# Patient Record
Sex: FEMALE | Employment: UNEMPLOYED | URBAN - METROPOLITAN AREA
[De-identification: names, ages, dates, MRNs, and addresses within clinical notes are randomized per-mention and may not be internally consistent; named-entity substitution may affect disease eponyms.]

---

## 2017-06-26 ENCOUNTER — HOSPITAL ENCOUNTER (OUTPATIENT)
Dept: LAB | Age: 57
Discharge: HOME | End: 2017-06-26
Payer: MEDICARE

## 2017-06-26 PROCEDURE — 87624 HPV HI-RISK TYP POOLED RSLT: CPT | Performed by: OBSTETRICS & GYNECOLOGY

## 2017-06-26 PROCEDURE — 88175 CYTOPATH C/V AUTO FLUID REDO: CPT | Performed by: OBSTETRICS & GYNECOLOGY

## 2018-01-15 ENCOUNTER — HOSPITAL ENCOUNTER (OUTPATIENT)
Dept: LAB | Age: 58
Discharge: HOME | End: 2018-01-15
Payer: MEDICARE

## 2018-01-15 PROCEDURE — 88175 CYTOPATH C/V AUTO FLUID REDO: CPT | Performed by: OBSTETRICS & GYNECOLOGY

## 2018-04-30 ENCOUNTER — HOSPITAL ENCOUNTER (OUTPATIENT)
Dept: LAB | Age: 58
Discharge: HOME | End: 2018-04-30
Payer: MEDICARE

## 2018-04-30 PROCEDURE — 88175 CYTOPATH C/V AUTO FLUID REDO: CPT | Performed by: OBSTETRICS & GYNECOLOGY

## 2018-05-21 ENCOUNTER — HOSPITAL ENCOUNTER (EMERGENCY)
Age: 58
Discharge: HOME | End: 2018-05-21
Attending: EMERGENCY MEDICINE
Payer: MEDICARE

## 2018-05-21 VITALS
TEMPERATURE: 98 F | HEART RATE: 46 BPM | DIASTOLIC BLOOD PRESSURE: 69 MMHG | SYSTOLIC BLOOD PRESSURE: 135 MMHG | OXYGEN SATURATION: 99 % | RESPIRATION RATE: 19 BRPM

## 2018-05-21 DIAGNOSIS — M79.89 RIGHT LEG SWELLING: Primary | ICD-10-CM

## 2018-05-21 LAB
ALBUMIN SERPL-MCNC: 4.1 G/DL (ref 3.4–5)
ALBUMIN/GLOB SERPL: 1.3 {RATIO} (ref 0.8–1.7)
ALP SERPL-CCNC: 106 U/L (ref 45–117)
ALT SERPL-CCNC: 23 U/L (ref 13–56)
ANION GAP SERPL CALC-SCNC: 8 MMOL/L (ref 3–18)
AST SERPL-CCNC: 22 U/L (ref 15–37)
BASOPHILS # BLD: 0 K/UL (ref 0–0.06)
BASOPHILS NFR BLD: 1 % (ref 0–2)
BILIRUB SERPL-MCNC: 0.2 MG/DL (ref 0.2–1)
BNP SERPL-MCNC: 1224 PG/ML (ref 0–900)
BUN SERPL-MCNC: 42 MG/DL (ref 7–18)
BUN/CREAT SERPL: 25 (ref 12–20)
CALCIUM SERPL-MCNC: 8.4 MG/DL (ref 8.5–10.1)
CHLORIDE SERPL-SCNC: 112 MMOL/L (ref 100–108)
CO2 SERPL-SCNC: 22 MMOL/L (ref 21–32)
CREAT SERPL-MCNC: 1.71 MG/DL (ref 0.6–1.3)
DIFFERENTIAL METHOD BLD: ABNORMAL
EOSINOPHIL # BLD: 0.2 K/UL (ref 0–0.4)
EOSINOPHIL NFR BLD: 5 % (ref 0–5)
ERYTHROCYTE [DISTWIDTH] IN BLOOD BY AUTOMATED COUNT: 15.5 % (ref 11.6–14.5)
GLOBULIN SER CALC-MCNC: 3.1 G/DL (ref 2–4)
GLUCOSE SERPL-MCNC: 103 MG/DL (ref 74–99)
HCT VFR BLD AUTO: 34.2 % (ref 35–45)
HGB BLD-MCNC: 11.2 G/DL (ref 12–16)
INR PPP: 1.1 (ref 0.8–1.2)
LYMPHOCYTES # BLD: 0.7 K/UL (ref 0.9–3.6)
LYMPHOCYTES NFR BLD: 21 % (ref 21–52)
MCH RBC QN AUTO: 29.9 PG (ref 24–34)
MCHC RBC AUTO-ENTMCNC: 32.7 G/DL (ref 31–37)
MCV RBC AUTO: 91.4 FL (ref 74–97)
MONOCYTES # BLD: 0.3 K/UL (ref 0.05–1.2)
MONOCYTES NFR BLD: 8 % (ref 3–10)
NEUTS SEG # BLD: 2.2 K/UL (ref 1.8–8)
NEUTS SEG NFR BLD: 65 % (ref 40–73)
PLATELET # BLD AUTO: 126 K/UL (ref 135–420)
PMV BLD AUTO: 10.4 FL (ref 9.2–11.8)
POTASSIUM SERPL-SCNC: 5.1 MMOL/L (ref 3.5–5.5)
PROT SERPL-MCNC: 7.2 G/DL (ref 6.4–8.2)
PROTHROMBIN TIME: 13.7 SEC (ref 11.5–15.2)
RBC # BLD AUTO: 3.74 M/UL (ref 4.2–5.3)
SODIUM SERPL-SCNC: 142 MMOL/L (ref 136–145)
WBC # BLD AUTO: 3.4 K/UL (ref 4.6–13.2)

## 2018-05-21 PROCEDURE — 80053 COMPREHEN METABOLIC PANEL: CPT | Performed by: EMERGENCY MEDICINE

## 2018-05-21 PROCEDURE — 85025 COMPLETE CBC W/AUTO DIFF WBC: CPT | Performed by: EMERGENCY MEDICINE

## 2018-05-21 PROCEDURE — 93971 EXTREMITY STUDY: CPT

## 2018-05-21 PROCEDURE — 85610 PROTHROMBIN TIME: CPT | Performed by: EMERGENCY MEDICINE

## 2018-05-21 PROCEDURE — 93005 ELECTROCARDIOGRAM TRACING: CPT

## 2018-05-21 PROCEDURE — 99285 EMERGENCY DEPT VISIT HI MDM: CPT

## 2018-05-21 PROCEDURE — 94762 N-INVAS EAR/PLS OXIMTRY CONT: CPT

## 2018-05-21 PROCEDURE — 83880 ASSAY OF NATRIURETIC PEPTIDE: CPT | Performed by: EMERGENCY MEDICINE

## 2018-05-21 RX ORDER — LISINOPRIL 5 MG/1
5 TABLET ORAL 2 TIMES DAILY
COMMUNITY
Start: 2018-02-19

## 2018-05-21 RX ORDER — ACETAMINOPHEN 325 MG/1
325 TABLET ORAL
COMMUNITY

## 2018-05-21 RX ORDER — PRAVASTATIN SODIUM 20 MG/1
40 TABLET ORAL
COMMUNITY
Start: 2018-02-19

## 2018-05-21 RX ORDER — FUROSEMIDE 20 MG/1
40 TABLET ORAL 2 TIMES DAILY
COMMUNITY
Start: 2018-02-19 | End: 2018-07-12 | Stop reason: DRUGHIGH

## 2018-05-21 RX ORDER — CETIRIZINE HCL 10 MG
10 TABLET ORAL DAILY
COMMUNITY
Start: 2018-02-27

## 2018-05-21 RX ORDER — HYDROXYZINE 25 MG/1
25 TABLET, FILM COATED ORAL
COMMUNITY
Start: 2018-02-19

## 2018-05-21 NOTE — ED NOTES
Pt initially refused cardiac monitor because she states the electrodes cause a rash over time.   Pt then agreed to be placed on monitor and states she will take them off if they start to irritate her skin

## 2018-05-21 NOTE — ED PROVIDER NOTES
HPI Comments: Carlos Braga is a 65 y.o. Female with remote h/o cardiac transplant 80, with c/o swelling to rle today. Has been swelling off/on for last month but got worse today. Denies cp, new sob. No fcs. Swelling is constant with nothing taken. No h/o vte. Was on eliquis which she stopped 5 days ago due to gi procedure. No recent immobilization, surgery, active cancer    The history is provided by the patient and medical records. Past Medical History:   Diagnosis Date    Arthritis     Avascular necrosis of femoral head, left ([address]     CAD (coronary artery disease)     Cancer (HCC)     Vaginal    GERD (gastroesophageal reflux disease)     Hypercholesteremia     Hypertension     Ill-defined condition     fractured foot, Bilateral    Ill-defined condition     wrist fracture    Psychiatric disorder     Depression, Anxiety, Bipolar    S/P radiation therapy     Status post chemotherapy        Past Surgical History:   Procedure Laterality Date    HIP ARTHROSCOPY, DX      HX CYST REMOVAL  2006    HX HEART VALVE SURGERY  2013    Tricuspid valve    TRANSPLANTATION OF HEART  1999         No family history on file. Social History     Social History    Marital status: LEGALLY      Spouse name: N/A    Number of children: N/A    Years of education: N/A     Occupational History    Not on file. Social History Main Topics    Smoking status: Former Smoker     Quit date: 1/23/2015    Smokeless tobacco: Never Used    Alcohol use No    Drug use: No    Sexual activity: Not on file     Other Topics Concern    Not on file     Social History Narrative         ALLERGIES: Latex, natural rubber; Dobutamine; Imuran [azathioprine]; Other medication; Nsaids (non-steroidal anti-inflammatory drug); Avelox [moxifloxacin]; Milrinone; Other plant, animal, environmental; Tape [adhesive]; and Copper    Review of Systems   Constitutional: Negative for fever.    HENT: Negative for sore throat and trouble swallowing. Eyes: Negative for visual disturbance. Respiratory: Negative for cough. Cardiovascular: Positive for leg swelling. Negative for chest pain. Gastrointestinal: Positive for blood in stool (intermittently since feb which is why she is seeing gi upvoming). Negative for abdominal pain. Endocrine: Negative for polyuria. Genitourinary: Negative for difficulty urinating. Musculoskeletal: Negative for gait problem. Skin: Negative for rash. Allergic/Immunologic: Negative for immunocompromised state. Neurological: Negative for syncope. Hematological: Bruises/bleeds easily (when she is on eliquis). Psychiatric/Behavioral: Negative for sleep disturbance. Vitals:    05/21/18 1930 05/21/18 1932 05/21/18 1945 05/21/18 2000   BP: 151/79  138/70 134/73   Pulse:  (!) 48 (!) 48 (!) 45   Resp:  13  16   Temp:       SpO2:  99% 98% 98%            Physical Exam   Constitutional: She is oriented to person, place, and time. She appears well-developed and well-nourished. No distress. HENT:   Head: Normocephalic and atraumatic. Right Ear: External ear normal.   Left Ear: External ear normal.   Nose: Nose normal.   Mouth/Throat: Uvula is midline, oropharynx is clear and moist and mucous membranes are normal.   Eyes: Conjunctivae are normal. No scleral icterus. Neck: Neck supple. Cardiovascular: Normal rate, regular rhythm and intact distal pulses. Murmur heard. Pulmonary/Chest: Effort normal and breath sounds normal.   Abdominal: Soft. There is no tenderness. Musculoskeletal: She exhibits edema. Legs:  Neurological: She is alert and oriented to person, place, and time. Gait normal.   Skin: Skin is warm and dry. She is not diaphoretic. Psychiatric: Her behavior is normal.   Nursing note and vitals reviewed.        [facility]      ED Course       Procedures  Vitals:  Patient Vitals for the past 12 hrs:   Temp Pulse Resp BP SpO2   05/21/18 2000 - (!) 45 16 134/73 98 % 05/21/18 1945 - (!) 48 - 138/70 98 %   05/21/18 1932 - (!) 48 13 - 99 %   05/21/18 1930 - - - 151/79 -   05/21/18 1915 - (!) 46 18 128/62 97 %   05/21/18 1900 - (!) 47 18 118/74 96 %   05/21/18 1848 98 °F (36.7 °C) (!) 48 13 (!) 142/92 97 %   05/21/18 1847 98.4 °F (36.9 °C) (!) 47 12 120/74 97 %         Medications ordered:   Medications - No data to display      Lab findings:  Recent Results (from the past 12 hour(s))   CBC WITH AUTOMATED DIFF    Collection Time: 05/21/18  6:15 PM   Result Value Ref Range    WBC 3.4 (L) 4.6 - 13.2 K/uL    RBC 3.74 (L) 4.20 - 5.30 M/uL    HGB 11.2 (L) 12.0 - 16.0 g/dL    HCT 34.2 (L) 35.0 - 45.0 %    MCV 91.4 74.0 - 97.0 FL    MCH 29.9 24.0 - 34.0 PG    MCHC 32.7 31.0 - 37.0 g/dL    RDW 15.5 (H) 11.6 - 14.5 %    PLATELET 531 (L) 058 - 420 K/uL    MPV 10.4 9.2 - 11.8 FL    NEUTROPHILS 65 40 - 73 %    LYMPHOCYTES 21 21 - 52 %    MONOCYTES 8 3 - 10 %    EOSINOPHILS 5 0 - 5 %    BASOPHILS 1 0 - 2 %    ABS. NEUTROPHILS 2.2 1.8 - 8.0 K/UL    ABS. LYMPHOCYTES 0.7 (L) 0.9 - 3.6 K/UL    ABS. MONOCYTES 0.3 0.05 - 1.2 K/UL    ABS. EOSINOPHILS 0.2 0.0 - 0.4 K/UL    ABS. BASOPHILS 0.0 0.0 - 0.06 K/UL    DF AUTOMATED     PROTHROMBIN TIME + INR    Collection Time: 05/21/18  6:15 PM   Result Value Ref Range    Prothrombin time 13.7 11.5 - 15.2 sec    INR 1.1 0.8 - 1.2     METABOLIC PANEL, COMPREHENSIVE    Collection Time: 05/21/18  6:15 PM   Result Value Ref Range    Sodium 142 136 - 145 mmol/L    Potassium 5.1 3.5 - 5.5 mmol/L    Chloride 112 (H) 100 - 108 mmol/L    CO2 22 21 - 32 mmol/L    Anion gap 8 3.0 - 18 mmol/L    Glucose 103 (H) 74 - 99 mg/dL    BUN 42 (H) 7.0 - 18 MG/DL    Creatinine 1.71 (H) 0.6 - 1.3 MG/DL    BUN/Creatinine ratio 25 (H) 12 - 20      GFR est AA 37 (L) >60 ml/min/1.73m2    GFR est non-AA 31 (L) >60 ml/min/1.73m2    Calcium 8.4 (L) 8.5 - 10.1 MG/DL    Bilirubin, total 0.2 0.2 - 1.0 MG/DL    ALT (SGPT) 23 13 - 56 U/L    AST (SGOT) 22 15 - 37 U/L    Alk.  phosphatase 106 45 - 117 U/L    Protein, total 7.2 6.4 - 8.2 g/dL    Albumin 4.1 3.4 - 5.0 g/dL    Globulin 3.1 2.0 - 4.0 g/dL    A-G Ratio 1.3 0.8 - 1.7     NT-PRO BNP    Collection Time: 05/21/18  6:15 PM   Result Value Ref Range    NT pro-BNP 1224 (H) 0 - 900 PG/ML       EKG interpretation by ED Physician:      X-Ray, CT or other radiology findings or impressions:  DUPLEX LOWER EXT VENOUS RIGHT         neg for dvt      Progress notes, Consult notes or additional Procedure notes:   No dvt. prob slight fluid overload. Pt has lasix to take as needed which she has not been. Doubt need for other work, transplant consult  I have discussed with patient and/or family/sig other the results, interpretation of any imaging if performed, suspected diagnosis and treatment plan to include instructions regarding the diagnoses listed to which understanding was expressed with all questions answered      Reevaluation of patient:   stable    Disposition:  Diagnosis:   1. Right leg swelling        Disposition: home    Follow-up Information     Follow up With Details Comments Contact Info    Reagan Fuller MD Schedule an appointment as soon as possible for a visit  [place]  [phone]              Patient's Medications   Start Taking    No medications on file   Continue Taking    ACETAMINOPHEN (TYLENOL) 325 MG TABLET    325 mg. APIXABAN (ELIQUIS) 5 MG TABLET    5 mg. ASPIRIN DELAYED-RELEASE 81 MG TABLET    Take 81 mg by mouth daily. BUPROPION SR (WELLBUTRIN SR) 200 MG SR TABLET    Take 200 mg by mouth two (2) times a day. Indications: DEPRESSION    CETIRIZINE (ZYRTEC) 10 MG TABLET    10 mg. CYCLOSPORINE MODIFIED (NEORAL) 25 MG CAPSULE    Take 50 mg by mouth two (2) times a day. FUROSEMIDE (LASIX) 20 MG TABLET    20 mg. HYDROXYZINE HCL (ATARAX) 25 MG TABLET    25 mg. LISINOPRIL (PRINIVIL, ZESTRIL) 5 MG TABLET    5 mg. LORAZEPAM (ATIVAN) 1 MG TABLET    Take 1 mg by mouth three (3) times daily. MYCOPHENOLATE (CELLCEPT) 500 MG TABLET    Take 250 mg by mouth two (2) times a day. OXCARBAZEPINE (TRILEPTAL) 150 MG TABLET    Take  by mouth every morning. OXCARBAZEPINE (TRILEPTAL) 300 MG TABLET    Take 450 mg by mouth nightly. POLYETHYLENE GLYCOL (MIRALAX) 17 GRAM PACKET    Take 8.5 g by mouth daily as needed. PRAVASTATIN (PRAVACHOL) 20 MG TABLET    40 mg. PREGABALIN (LYRICA) 200 MG CAPSULE    Take 200 mg by mouth three (3) times daily.    These Medications have changed    No medications on file   Stop Taking    No medications on file

## 2018-05-21 NOTE — ED TRIAGE NOTES
Pt states she noticed swelling to her right leg today and reports SOB on exertion over the past 4 months

## 2018-05-22 ENCOUNTER — ANESTHESIA EVENT (OUTPATIENT)
Dept: ENDOSCOPY | Age: 58
End: 2018-05-22
Payer: MEDICARE

## 2018-05-22 LAB
ATRIAL RATE: 49 BPM
CALCULATED P AXIS, ECG09: 20 DEGREES
CALCULATED R AXIS, ECG10: 0 DEGREES
CALCULATED T AXIS, ECG11: -56 DEGREES
DIAGNOSIS, 93000: NORMAL
P-R INTERVAL, ECG05: 274 MS
Q-T INTERVAL, ECG07: 468 MS
QRS DURATION, ECG06: 162 MS
QTC CALCULATION (BEZET), ECG08: 422 MS
VENTRICULAR RATE, ECG03: 49 BPM

## 2018-05-22 NOTE — PROCEDURES
[facility]  *** FINAL REPORT ***    Name: Kusum Huynh  MRN: [id number]    Outpatient  : 1960  HIS Order #: [id number]  [id number] [facility] Visit #: [id number]  Date: 21 May 2018    TYPE OF TEST: Peripheral Venous Testing    REASON FOR TEST  Pain in limb    Right Leg:-  Deep venous thrombosis:           No  Superficial venous thrombosis:    No  Deep venous insufficiency:        Not examined  Superficial venous insufficiency: Not examined      INTERPRETATION/FINDINGS  Duplex images were obtained using 2-D gray scale, color flow, and  spectral Doppler analysis. Right leg :  1. Deep vein(s) visualized include the common femoral, proximal  femoral, mid femoral, distal femoral, popliteal(above knee),  popliteal(fossa), popliteal(below knee), posterior tibial and peroneal   veins. 2. No evidence of deep venous thrombosis detected in the veins  visualized. 3. No evidence of deep vein thrombosis in the contralateral common  femoral vein. 4. No evidence of superficial thrombosis detected. ADDITIONAL COMMENTS    I have personally reviewed the data relevant to the interpretation of  this  study. TECHNOLOGIST: WALTER Spangler  Signed: 2018 08:10 PM    PHYSICIAN: Linda Valdes.  Mavis Moncada MD  Signed: 2018 11:04 PM

## 2018-05-22 NOTE — ED NOTES
8:39 PM  05/21/18     Discharge instructions given to patient (name) with verbalization of understanding. Patient accompanied by friend. Patient discharged with the following prescriptions none. Patient discharged to home (destination).       Winifred Amaral RN

## 2018-05-23 ENCOUNTER — ANESTHESIA (OUTPATIENT)
Dept: ENDOSCOPY | Age: 58
End: 2018-05-23
Payer: MEDICARE

## 2018-05-23 ENCOUNTER — HOSPITAL ENCOUNTER (OUTPATIENT)
Age: 58
Setting detail: OUTPATIENT SURGERY
Discharge: HOME | End: 2018-05-23
Attending: INTERNAL MEDICINE | Admitting: INTERNAL MEDICINE
Payer: MEDICARE

## 2018-05-23 VITALS
RESPIRATION RATE: 18 BRPM | SYSTOLIC BLOOD PRESSURE: 95 MMHG | HEART RATE: 54 BPM | DIASTOLIC BLOOD PRESSURE: 72 MMHG | TEMPERATURE: 97.8 F | HEIGHT: 66 IN | OXYGEN SATURATION: 96 % | WEIGHT: 196.56 LBS | BODY MASS INDEX: 31.59 KG/M2

## 2018-05-23 PROBLEM — R19.4 ENCOUNTER FOR DIAGNOSTIC COLONOSCOPY DUE TO CHANGE IN BOWEL HABITS: Status: ACTIVE | Noted: 2018-05-23

## 2018-05-23 PROBLEM — K62.5 RECTAL BLEEDING: Status: ACTIVE | Noted: 2018-05-23

## 2018-05-23 PROCEDURE — 74011250636 HC RX REV CODE- 250/636: Performed by: NURSE ANESTHETIST, CERTIFIED REGISTERED

## 2018-05-23 PROCEDURE — 74011250636 HC RX REV CODE- 250/636

## 2018-05-23 PROCEDURE — 74011000250 HC RX REV CODE- 250

## 2018-05-23 PROCEDURE — 76060000031 HC ANESTHESIA FIRST 0.5 HR: Performed by: INTERNAL MEDICINE

## 2018-05-23 PROCEDURE — 76040000019: Performed by: INTERNAL MEDICINE

## 2018-05-23 PROCEDURE — 77030031670 HC DEV INFL ENDOTEK BIG60 MRTM -B: Performed by: INTERNAL MEDICINE

## 2018-05-23 RX ORDER — SODIUM CHLORIDE 0.9 % (FLUSH) 0.9 %
5-10 SYRINGE (ML) INJECTION EVERY 8 HOURS
Status: CANCELLED | OUTPATIENT
Start: 2018-05-23 | End: 2018-05-23

## 2018-05-23 RX ORDER — LIDOCAINE HYDROCHLORIDE 20 MG/ML
INJECTION, SOLUTION EPIDURAL; INFILTRATION; INTRACAUDAL; PERINEURAL AS NEEDED
Status: DISCONTINUED | OUTPATIENT
Start: 2018-05-23 | End: 2018-05-23 | Stop reason: HOSPADM

## 2018-05-23 RX ORDER — SODIUM CHLORIDE, SODIUM LACTATE, POTASSIUM CHLORIDE, CALCIUM CHLORIDE 600; 310; 30; 20 MG/100ML; MG/100ML; MG/100ML; MG/100ML
25 INJECTION, SOLUTION INTRAVENOUS CONTINUOUS
Status: DISCONTINUED | OUTPATIENT
Start: 2018-05-23 | End: 2018-05-23 | Stop reason: HOSPADM

## 2018-05-23 RX ORDER — SODIUM CHLORIDE 9 MG/ML
25 INJECTION, SOLUTION INTRAVENOUS CONTINUOUS
Status: CANCELLED | OUTPATIENT
Start: 2018-05-23 | End: 2018-05-23

## 2018-05-23 RX ORDER — FENTANYL CITRATE 50 UG/ML
INJECTION, SOLUTION INTRAMUSCULAR; INTRAVENOUS AS NEEDED
Status: DISCONTINUED | OUTPATIENT
Start: 2018-05-23 | End: 2018-05-23 | Stop reason: HOSPADM

## 2018-05-23 RX ORDER — FAMOTIDINE 20 MG/1
20 TABLET, FILM COATED ORAL ONCE
Status: DISCONTINUED | OUTPATIENT
Start: 2018-05-23 | End: 2018-05-23 | Stop reason: HOSPADM

## 2018-05-23 RX ORDER — SODIUM CHLORIDE 0.9 % (FLUSH) 0.9 %
5-10 SYRINGE (ML) INJECTION AS NEEDED
Status: CANCELLED | OUTPATIENT
Start: 2018-05-23 | End: 2018-05-23

## 2018-05-23 RX ORDER — PROPOFOL 10 MG/ML
INJECTION, EMULSION INTRAVENOUS AS NEEDED
Status: DISCONTINUED | OUTPATIENT
Start: 2018-05-23 | End: 2018-05-23 | Stop reason: HOSPADM

## 2018-05-23 RX ORDER — EPINEPHRINE 1 MG/ML
INJECTION, SOLUTION, CONCENTRATE INTRAVENOUS AS NEEDED
Status: DISCONTINUED | OUTPATIENT
Start: 2018-05-23 | End: 2018-05-23 | Stop reason: HOSPADM

## 2018-05-23 RX ORDER — DEXTROMETHORPHAN/PSEUDOEPHED 2.5-7.5/.8
1.2 DROPS ORAL
Status: CANCELLED | OUTPATIENT
Start: 2018-05-23

## 2018-05-23 RX ADMIN — LIDOCAINE HYDROCHLORIDE 40 MG: 20 INJECTION, SOLUTION EPIDURAL; INFILTRATION; INTRACAUDAL; PERINEURAL at 13:53

## 2018-05-23 RX ADMIN — PROPOFOL 30 MG: 10 INJECTION, EMULSION INTRAVENOUS at 13:53

## 2018-05-23 RX ADMIN — EPINEPHRINE 0.1 MG: 1 INJECTION, SOLUTION, CONCENTRATE INTRAVENOUS at 13:53

## 2018-05-23 RX ADMIN — PROPOFOL 10 MG: 10 INJECTION, EMULSION INTRAVENOUS at 13:55

## 2018-05-23 RX ADMIN — PROPOFOL 10 MG: 10 INJECTION, EMULSION INTRAVENOUS at 13:59

## 2018-05-23 RX ADMIN — SODIUM CHLORIDE, SODIUM LACTATE, POTASSIUM CHLORIDE, AND CALCIUM CHLORIDE 25 ML/HR: 600; 310; 30; 20 INJECTION, SOLUTION INTRAVENOUS at 13:44

## 2018-05-23 RX ADMIN — FENTANYL CITRATE 25 MCG: 50 INJECTION, SOLUTION INTRAMUSCULAR; INTRAVENOUS at 13:57

## 2018-05-23 RX ADMIN — PROPOFOL 10 MG: 10 INJECTION, EMULSION INTRAVENOUS at 14:06

## 2018-05-23 RX ADMIN — PROPOFOL 20 MG: 10 INJECTION, EMULSION INTRAVENOUS at 13:57

## 2018-05-23 RX ADMIN — PROPOFOL 10 MG: 10 INJECTION, EMULSION INTRAVENOUS at 14:01

## 2018-05-23 RX ADMIN — PROPOFOL 10 MG: 10 INJECTION, EMULSION INTRAVENOUS at 14:04

## 2018-05-23 RX ADMIN — PROPOFOL 10 MG: 10 INJECTION, EMULSION INTRAVENOUS at 13:54

## 2018-05-23 NOTE — H&P
Reason for Appointment   1. Rectal Bleeding. ???hemorrhoids   2. Constipation     History of Present Illness   General:   I saw the patient in the office last in June 2016, at that point because of left lower quadrant pain in the setting of constipation. I suggested that she change MiraLAX twice a day to once a day 2 doses at the time, and adjust the dose to give a soft solid formed stool every day. I was suspicious that her left lower quadrant pain was secondary to constipation itself. In terms of colon cancer screening she had a personal history of adenomatous polyps. Her next colonoscopy was due in late 2017 on a 5 year surveillance interval. She did not return last year for a colonoscopy as recommended. In terms of acid reflux symptoms these were primarily nocturnal. She was advised to sleep on a wedge pillow, not to eat or drink anything within 3 hours of bedtime and I did not recommend pharmacologic therapy. I have not seen the patient now in almost 2 years. May 7, 2018: The patient indicates that she was diagnosed with vaginal cancer in late 2016 and spent the rest of 2016 and 2017 being treated. She has been declared in remission now, and has been told that it is time to turn her attention back to colon cancer screening and come back to see me and arrange a colonoscopy. In addition, since February or so she's been having frequent episodes of rectal bleeding. She went to the ER on December 26 with hemorrhoidal bleeding and was given Anusol and Entocort. She saw her primary care physician who thought she might have hemorrhoids in addition to recurrent anal condyloma, and was told to use ProctoFoam, but also to see me for evaluation and treatment. The patient indicates that she did take MiraLAX shortly after seeing me in 2016, but started a high-fiber diet and found that her constipation problems were well controlled with dietary fiber alone.  In January or February of this year her bowel movements changed, however. Instead of having soft, formed stool every day she is having a hard stool or semi-hard formed stool every 3-4 days with some straining. She has been taking Colace on a regular basis which has made the stools somewhat less hard. She still is having bleeding almost every time she moves her bowels, and sometimes even without defecation. The blood is red and thick without clots. It does drip out of the anus at times. It does color the commode water red and seems to be separate from the bowel movement itself. She does not have any significant anal pain or abdominal discomfort. Her appetite is good and her weight is stable. When she was taking MiraLAX previously at a dose of 17 g daily she was having soft mushy stools several times a day which was inconvenient. She did not try to reduce the dose to get a more solid stool that was less frequent. She has no symptoms of indigestion, heartburn or trouble swallowing. Her appetite is good and her weight is stable. Within the last week her right lower extremity trauma to the foot and ankle and lower shin were much more swollen than the left. This resolved spontaneously. There was no tenderness in her calf or posterior thigh associated with unilateral edema. She's never had this problem in the past. She did have surgery for ingrown toenail on the right however, this finally seems to have healed.      Current Medications   Taking    Wellbutrin  mg/12 hours tablet, extended release 1 tab(s) orally 3 times a day    lorazepam 1 mg tablet 1 tab(s) orally 3 times a day    Trileptal 150 mg tablet 1 tab(s) orally 4 times a day    Aspir 81 81 mg delayed release tablet 1 tab(s) orally once a day    Lyrica 200 mg capsule 1 cap(s) orally 3 times a day    MiraLax - powder for reconstitution 17 g orally once a day    Colace sodium 100 mg capsule 1 cap(s) orally once a day    CellCept 250 mg capsule 3 tabs orally 2 times a day    pravastatin 20 mg tablet 1 tab(s) orally bid Eliquis 5 mg tablet 1 tab(s) orally 2 times a day    Lasix 20 mg tablet 1 tab(s) orally once a day    lisinopril 10 mg tablet 1 tab(s) orally once a day    Discontinued    Folbee Vitamin B Complex with Folic Acid tablet 1 tab(s) orally once a day    Oscal 500 + D 3 times qd    Citrucel Lax 500 mg tablet 3tab(s) orally twice a day    trilipil 150mg in Am and 450 mg q PM    Lasix 20 mg tablet 1 tab(s) orally prn    potassium chloride 10 mEq capsule, extended release 1 cap(s) orally prn    simvastatin 20 mg tablet 1 tab(s) orally once a day (at bedtime)    Trimethoprim-Sulfamethoxazole 160-800 mg 1 tab PO every M,W,F    Flagyl 500 mg tablet 1 tab(s) orally Q6H    cyclosporine 125 mg capsule 1 cap(s) orally as directed    Medication List reviewed and reconciled with the patient      Past Medical History   Mitral valve regurgitation. Cardiomyopathy. Hypertension. Hyperlipidemia. Depression. Anxiety disorder. Bipolar disorder. Surgical History   Heart transplant    Left hip core decompression    laser for cervical cancer    Cyst resection from back    surgical excision of anal warts    Cardioversion 2017   Cardiac catheterization 2018     Family History   Father: , Father had heart attacks in his mid 35s ;suicide, diagnosed with Heart Disease   Mother: alive   Brother(s): 2 brothers alive - brother had a heart attack and  at age 32. ..1 is bipolar, diagnosed with Heart Disease   No family history of colon cancer in first degree relatives but her maternal grandmother had colon cancer. Congenital cardiomyopathy. No chronic liver disease. Social History   Marital Status: -----common-law . Occupation: Disabled (on disability). Smoking Tobacco use: former smoker quit in . Alcohol: yes, few drinks a week, liquor. no Tobacco.   no Drugs (Illicit), Crack recently 11/10/11. no Blood transfusions.       Allergies   Imuran: Bad liver trouble NSAIDS: contrary to immmunosuppressants   Avelox: ? ???   Latex: rash   Electrodes: hives     Review of Systems   Complete review of systems was taken and reviewed with the patient on 5/7/18 and will be scanned into the medical record. Positives will be noted in HPI. Negatives will not be listed here. Vital Signs   BMI 31.32, HR 51, /85, Wt 200, Ht 5'7\", RR 18. Examination   General examination:  LABORATORY DATA: Lab work March 16, 2018: WBC 4.7, hemoglobin 12.6, hematocrit 37.4, MCV 90 point was 150. Lab works Feb 2018: WBC 3.9, hemoglobin 11.5, hematocrit 35.2, MCV 90, platelets 183. BMP showed a glucose of 112, BUN of 57 crit 1.8, GFR estimated 34.8. CT scan of the chest abdomen and pelvis October 30, 2017: Indication history of vaginal cancer. Liver, spleen, bile ducts, gallbladder normal. Appendix not well visualized. Stomach and small bowel normal. Previously seen left upper lobe pulmonary nodule no longer appreciated. Other changes in the lungs unchanged. Physical Examination   General: Pleasant, anxious appearing female in no obvious distress. Nodes: No supraclavicular, axillary, cervical nodes palpable. Chest: Clear to auscultation with symmetric breath sounds and good air movement. Heart: First and second heart sounds are normal. No murmurs, rubs, gallops. Abdomen: Soft, nontender. No organomegaly or mass palpable. Bowel sounds are normal with no bruits. Extremities: No pedal edema. Rectal examination: Will be performed as part of colonoscopy. Assessments     1. Rectal bleeding - K62.5 (Primary), Differential diagnosis includes bleeding from hemorrhoids, anal condyloma, anal neoplasm, proctitis, distal colonic neoplasm, just a colonic polyp, endoscopic evaluation is necessary to determine the precise cause of bleeding so the most appropriate recommendations can be made as to therapy.  In the meantime, I suspect normalization of bowel function will help the bleeding as it sounds as though it is related to the trauma of defecation for the most part, although she does have spontaneous episodes of bleeding without defecation by her history. 2. Personal history of colonic polyps - Z86.010, Adenomatous polyps on colonoscopy in the past. The patient will be due for followup colonoscopy at a 5 year surveillance interval at the end of 2017 was recommended. This has been delayed because of her personal history of vaginal cancer, however. 3. Heart transplant status - Z94.1     4. Change in bowel habit - R19.4, Constipation recently well controlled on MiraLax twice a day and Colace in the past, and more recently controlled with a high-fiber diet by the patient's report. Her bowel pattern changed however, in January or February for unclear reasons. Medications have included pravastatin, Lisinopril and Lasix. There is some improvement in the softness of her stool taking Colace. I will suggest restarting MiraLAX at a lower dose than the previous dose to prevent mushy stool frequently to see if this is acceptable to the patient. 5. Hx of condyloma acuminatum - Z86.19, Anal condyloma on colonoscopy in 2008 removed at that time. No persistent or recurrent condyloma on colonoscopy in 2013. The patient reports that her PCP believes that condyloma has returned now based on recent digital examination. This will need to be determined at the time of upcoming colonoscopy. Referral to colorectal surgery may be necessary. Treatment   1. Rectal bleeding   Start PEG - 3350 * with electrolytes powder for reconstitution, -, 4000 mL, orally, every 15 minutes, 16 dose(s), 1 stock jug, Refills 0  Start Reglan for prep tablet, 5 mg, 1 tab(s), orally, 1 dose, 2 days, 2, Refills 0  Start Citrate of Magnesia liquid, 1.745 g/30 mL, 300 mL, orally, once, 2 dose(s), 2 bottles, Refills 0  IMAGING: Colonoscopy with MAC (Win) (Ordered for 05/23/2018)  Notes: 1.  Restart Miralax 8.5 grams in 3 ounces of beverage once a day on a daily basis. Depending on response, she may need to alternate between 8.5 g daily and 17 g daily. I want her to adjust the dose to give a soft, solid, formed bowel movement once a day, and not frequent episodes of mushy stool that she reports when she was taking 17 g daily. 2. Colonoscopy is being arranged because of a personal history of adenomatous polyps. She is somewhat overdue for a 5 year follow-up exam. The colonoscopy will also serve as the best diagnostic test for the cause of her rectal bleeding with hemorrhoids, condyloma, anal neoplasm, distal colonic neoplasm, just colonic polyp, proctitis. More specific treatment for rectal bleeding will depend on the endoscopic diagnosis. May 23 2 PM [facility]. Low residue diet for one week. PEG 3350/Reglan prep. 2. Personal history of colonic polyps   Notes: 1. The patient is overdue for a 5 year follow-up colonoscopy. This is rescheduled now. 3. Change in bowel habit   Start MiraLax powder for reconstitution, -, 8.5 - 17 g, orally, once a day, 30 days, 2 bottles, Refills 5  Notes: 1. Restart MiraLAX as above. Adjust the dose to comfort. 4. Hx of condyloma acuminatum   Notes: 1. Special attention to the anus at the time of colonoscopy to determine the cause of bleeding and determine if anal condyloma has returned. 5. Others   Notes: 5/8/18-Note edited and faxed to Dr. Barbara Busch.       Preventive Medicine   Counseling:   Diet/Adult weight   BMI management provided: Yes       No significant interval change in history or physical findings since last office visit

## 2018-05-23 NOTE — IP AVS SNAPSHOT
[address] [place] [address] [address] 
[phone] Patient: Noe Rosales MRN: [id number] [id number] About your hospitalization You were admitted on:  May 23, 2018 [facility] You were discharged on:  May 23, 2018 Why you were hospitalized Your primary diagnosis was:  Not on File Your diagnoses also included:  History Of Colon Polyps, Rectal Bleeding, Encounter For Diagnostic Colonoscopy Due To Change In Bowel Habits Follow-up Information Follow up With Details Comments Contact Info Pinky Pardo MD   [address] [place] [address]  
[address] [facility] [address] [address] [phone] Discharge Orders None A check lizeth indicates which time of day the medication should be taken. My Medications CONTINUE taking these medications Instructions Each Dose to Equal  
 Morning Noon Evening Bedtime  
 acetaminophen 325 mg tablet Commonly known as:  TYLENOL Your last dose was: Your next dose is:    
   
   
 325 mg.  
 325 mg  
    
   
   
   
  
 apixaban 5 mg tablet Commonly known as:  Mariola Copier Your last dose was: Your next dose is:    
   
   
 5 mg.  
 5 mg  
    
   
   
   
  
 aspirin delayed-release 81 mg tablet Your last dose was: Your next dose is: Take 81 mg by mouth daily. 81 mg  
    
   
   
   
  
 ATIVAN 1 mg tablet Generic drug:  LORazepam  
   
Your last dose was: Your next dose is: Take 1 mg by mouth three (3) times daily. 1 mg  
    
   
   
   
  
 cetirizine 10 mg tablet Commonly known as:  ZYRTEC Your last dose was: Your next dose is:    
   
   
 10 mg.  
 10 mg  
    
   
   
   
  
 furosemide 20 mg tablet Commonly known as:  LASIX Your last dose was:     
   
Your next dose is:    
   
   
 20 mg.  
 20 mg  
    
   
   
   
  
 hydrOXYzine HCl 25 mg tablet Commonly known as:  ATARAX Your last dose was: Your next dose is:    
   
   
 25 mg.  
 25 mg  
    
   
   
   
  
 lisinopril 5 mg tablet Commonly known as:  Phil Petit Your last dose was: Your next dose is:    
   
   
 5 mg.  
 5 mg LYRICA 200 mg capsule Generic drug:  pregabalin Your last dose was: Your next dose is: Take 200 mg by mouth three (3) times daily. 200 mg MIRALAX 17 gram packet Generic drug:  polyethylene glycol Your last dose was: Your next dose is: Take 8.5 g by mouth daily as needed. 8.5 g  
    
   
   
   
  
 mycophenolate 500 mg tablet Commonly known as:  CELLCEPT Your last dose was: Your next dose is: Take 250 mg by mouth two (2) times a day. 250 mg NEORAL 25 mg capsule Generic drug:  cycloSPORINE modified Your last dose was: Your next dose is: Take 50 mg by mouth two (2) times a day. 50 mg  
    
   
   
   
  
 pravastatin 20 mg tablet Commonly known as:  PRAVACHOL Your last dose was: Your next dose is:    
   
   
 40 mg.  
 40 mg  
    
   
   
   
  
 * TRILEPTAL 150 mg tablet Generic drug:  OXcarbazepine Your last dose was: Your next dose is: Take  by mouth every morning. * TRILEPTAL 300 mg tablet Generic drug:  OXcarbazepine Your last dose was: Your next dose is: Take 450 mg by mouth nightly. 450 mg WELLBUTRIN  mg SR tablet Generic drug:  buPROPion SR Your last dose was: Your next dose is: Take 200 mg by mouth two (2) times a day. Indications: DEPRESSION  
 200 mg * Notice:   This list has 2 medication(s) that are the same as other medications prescribed for you. Read the directions carefully, and ask your doctor or other care provider to review them with you. Discharge Instructions Colonoscopy: What to Expect at [facility] Your Recovery After you have a colonoscopy, you will stay at the clinic for 1 to 2 hours until the medicines wear off. Then you can go home. But you will need to arrange for a ride. Your doctor will tell you when you can eat and do your other usual activities. Your doctor will talk to you about when you will need your next colonoscopy. Your doctor can help you decide how often you need to be checked. This will depend on the results of your test and your risk for colorectal cancer. After the test, you may be bloated or have gas pains. You may need to pass gas. If a biopsy was done or a polyp was removed, you may have streaks of blood in your stool (feces) for a few days. This care sheet gives you a general idea about how long it will take for you to recover. But each person recovers at a different pace. Follow the steps below to get better as quickly as possible. How can you care for yourself at home? Activity ? · Rest when you feel tired. ? · You can do your normal activities when it feels okay to do so. Diet ? · Follow your doctor's directions for eating. ? · Unless your doctor has told you not to, drink plenty of fluids. This helps to replace the fluids that were lost during the colon prep. ? · Do not drink alcohol. Medicines ? · Your doctor will tell you if and when you can restart your medicines. He or she will also give you instructions about taking any new medicines. ? · If you take blood thinners, such as warfarin (Coumadin), clopidogrel (Plavix), or aspirin, be sure to talk to your doctor. He or she will tell you if and when to start taking those medicines again. Make sure that you understand exactly what your doctor wants you to do. ? · If polyps were removed or a biopsy was done during the test, your doctor may tell you not to take aspirin or other anti-inflammatory medicines for a few days. These include ibuprofen (Advil, Motrin) and naproxen (Aleve). Other instructions ? · For your safety, do not drive or operate machinery until the medicine wears off and you can think clearly. Your doctor may tell you not to drive or operate machinery until the day after your test.  
? · Do not sign legal documents or make major decisions until the medicine wears off and you can think clearly. The anesthesia can make it hard for you to fully understand what you are agreeing to. Follow-up care is a key part of your treatment and safety. Be sure to make and go to all appointments, and call your doctor if you are having problems. It's also a good idea to know your test results and keep a list of the medicines you take. When should you call for help? Call 911 anytime you think you may need emergency care. For example, call if: 
? · You passed out (lost consciousness). ? · You pass maroon or bloody stools. ? · You have trouble breathing. ?Call your doctor now or seek immediate medical care if: 
? · You have pain that does not get better after you take pain medicine. ? · You are sick to your stomach or cannot drink fluids. ? · You have new or worse belly pain. ? · You have blood in your stools. ? · You have a fever. ? · You cannot pass stools or gas. ? Watch closely for changes in your health, and be sure to contact your doctor if you have any problems. Where can you learn more? Go to http://ly-silke.info/. Enter E264 in the search box to learn more about \"Colonoscopy: What to Expect at Home. \" Current as of: May 12, 2017 Content Version: 11.4 © 0379-3248 Healthwise, Incorporated.  Care instructions adapted under license by [employer] (which disclaims liability or warranty for this information). If you have questions about a medical condition or this instruction, always ask your healthcare professional. [address] any warranty or liability for your use of this information. DISCHARGE SUMMARY from Nurse PATIENT INSTRUCTIONS: 
 
After general anesthesia or intravenous sedation, for 24 hours or while taking prescription Narcotics: · Limit your activities · Do not drive and operate hazardous machinery · Do not make important personal or business decisions · Do  not drink alcoholic beverages · If you have not urinated within 8 hours after discharge, please contact your surgeon on call. Report the following to your surgeon: 
· Excessive pain, swelling, redness or odor of or around the surgical area · Temperature over 100.5 · Nausea and vomiting lasting longer than 4 hours or if unable to take medications · Any signs of decreased circulation or nerve impairment to extremity: change in color, persistent  numbness, tingling, coldness or increase pain · Any questions What to do at Home: 
Recommended activity: Activity as tolerated and no driving for today. These are general instructions for a healthy lifestyle: No smoking/ No tobacco products/ Avoid exposure to second hand smoke Surgeon General's Warning:  Quitting smoking now greatly reduces serious risk to your health. Obesity, smoking, and sedentary lifestyle greatly increases your risk for illness A healthy diet, regular physical exercise & weight monitoring are important for maintaining a healthy lifestyle You may be retaining fluid if you have a history of heart failure or if you experience any of the following symptoms:  Weight gain of 3 pounds or more overnight or 5 pounds in a week, increased swelling in our hands or feet or shortness of breath while lying flat in bed.   Please call your doctor as soon as you notice any of these symptoms; do not wait until your next office visit. Recognize signs and symptoms of STROKE: 
 
F-face looks uneven A-arms unable to move or move unevenly S-speech slurred or non-existent T-time-call 911 as soon as signs and symptoms begin-DO NOT go Back to bed or wait to see if you get better-TIME IS BRAIN. Warning Signs of HEART ATTACK Call 911 if you have these symptoms: 
? Chest discomfort. Most heart attacks involve discomfort in the center of the chest that lasts more than a few minutes, or that goes away and comes back. It can feel like uncomfortable pressure, squeezing, fullness, or pain. ? Discomfort in other areas of the upper body. Symptoms can include pain or discomfort in one or both arms, the back, neck, jaw, or stomach. ? Shortness of breath with or without chest discomfort. ? Other signs may include breaking out in a cold sweat, nausea, or lightheadedness. Don't wait more than five minutes to call [address]! Fast action can save your life. Calling 911 is almost always the fastest way to get lifesaving treatment. Emergency Medical Services staff can begin treatment when they arrive  up to an hour sooner than if someone gets to the hospital by car. The discharge information has been reviewed with the patient. The patient verbalized understanding. Discharge medications reviewed with the patient and appropriate educational materials and side effects teaching were provided. Patient armband removed and given to patient to take home. Patient was informed of the privacy risks if armband lost or stolen 
 
______________________________ 
_____________________________________________________________________________________________________ RECOMMENDATIONS: 
1. Referral to Dr Sarwat Aguiar for surgical removal of uncomfortable,bleeding inflammed skin tag/atypical condyloma 2.   Next colonoscopy 5 years b/o adenomas in the past 
 3.  Start Methylcellulose tabs 2 in AM and 2 in PM daily to regulate bowel function. Introducing [facility] SERVICES! Catalina Foster introduces [employer] patient portal. Now you can access parts of your medical record, email your doctor's office, and request medication refills online. 1. In your internet browser, go to [email or web address] 2. Click on the First Time User? Click Here link in the Sign In box. You will see the New Member Sign Up page. 3. Enter your [employer] Access Code exactly as it appears below. You will not need to use this code after youve completed the sign-up process. If you do not sign up before the expiration date, you must request a new code. · [employer] Access Code: [email or web address] Expires: 8/19/2018  5:49 PM 
 
4. Enter the last four digits of your Social Security Number (xxxx) and Date of Birth (mm/dd/yyyy) as indicated and click Submit. You will be taken to the next sign-up page. 5. Create a N[employer]sec ID. This will be your N[employer] login ID and cannot be changed, so think of one that is secure and easy to remember. 6. Create a [employer] password. You can change your password at any time. 7. Enter your Password Reset Question and Answer. This can be used at a later time if you forget your password. 8. Enter your e-mail address. You will receive e-mail notification when new information is available in [address] 9. Click Sign Up. You can now view and download portions of your medical record. 10. Click the Download Summary menu link to download a portable copy of your medical information. If you have questions, please visit the Frequently Asked Questions section of the [employer] website. Remember, [employer] is NOT to be used for urgent needs. For medical emergencies, dial 911. Now available from your iPhone and Android! Introducing Donavon Dumas As a Catalina Cristian patient, I wanted to make you aware of our electronic visit tool called Donavon [email or web address] Social. [employer] 24/7 allows you to connect within minutes with a medical provider 24 hours a day, seven days a week via a mobile device or tablet or logging into a secure website from your computer. You can access [employer] from anywhere in the [place]. A virtual visit might be right for you when you have a simple condition and feel like you just dont want to get out of bed, or cant get away from work for an appointment, when your regular Benuel Omi provider is not available (evenings, weekends or holidays), or when youre out of town and need minor care. Electronic visits cost only $49 and if the [employer] 24/7 provider determines a prescription is needed to treat your condition, one can be electronically transmitted to a nearby pharmacy*. Please take a moment to enroll today if you have not already done so. The enrollment process is free and takes just a few minutes. To enroll, please download the [employer] 24/7 aretha to your tablet or phone, or visit [email or web address] to enroll on your computer. And, as an [address] [place] [address] patient with a [employer] account, the results of your visits will be scanned into your electronic medical record and your primary care provider will be able to view the scanned results. We urge you to continue to see your regular Tabatha Metzgerting provider for your ongoing medical care. And while your primary care provider may not be the one available when you seek a [employer] virtual visit, the peace of mind you get from getting a real diagnosis real time can be priceless. For more information on [employer], view our Frequently Asked Questions (FAQs) at [email or web address]. Sincerely, 
 
Ramonita Hale MD 
Chief Medical Officer Laron Malave *:  certain medications cannot be prescribed via [employer] Providers Seen During Your Hospitalization Provider Specialty Primary office phone Morgan Monson MD Gastroenterology [phone] Your Primary Care Physician (PCP) Primary Care Physician Office Phone Office Fax Dawson Guzman [phone] You are allergic to the following Allergen Reactions Latex, Natural Rubber Rash Dobutamine Anaphylaxis Imuran (Azathioprine) Other (comments) Bad liver trouble Other Medication Hives  
 electrodes Nsaids (Non-Steroidal Anti-Inflammatory Drug) Other (comments) Contrary to immunosuppressants Avelox (Moxifloxacin) Other (comments)  
 unknown Milrinone Other (comments) Migraine headache Other Plant, Animal, Environmental Rash Allergic to band-aids, detergent, deodorant, bath soap Tape (Adhesive) Hives Rash Contact Dermatitis Other (comments) Scars Copper Rash Recent Documentation Height Weight Breastfeeding? BMI OB Status Smoking Status 1.676 m 89.2 kg No 31.73 kg/m2 Postmenopausal Former Smoker Emergency Contacts Name Discharge Info Relation Home Work Mobile Dot Chahal DISCHARGE CAREGIVER [3] Mother [14]   [phone] Sydnie Hassan DISCHARGE CAREGIVER [3] Daughter [21] [phone] Patient Belongings The following personal items are in your possession at time of discharge: 
  Dental Appliances: None  Visual Aid: None Please provide this summary of care documentation to your next provider. Signatures-by signing, you are acknowledging that this After Visit Summary has been reviewed with you and you have received a copy. Patient Signature:  ____________________________________________________________ Date:  ____________________________________________________________  
  
Quinton Palacios  Provider Signature: ____________________________________________________________ Date:  ____________________________________________________________

## 2018-05-23 NOTE — DISCHARGE INSTRUCTIONS
Colonoscopy: What to Expect at [address] [place] [address]  After you have a colonoscopy, you will stay at the clinic for 1 to 2 hours until the medicines wear off. Then you can go home. But you will need to arrange for a ride. Your doctor will tell you when you can eat and do your other usual activities. Your doctor will talk to you about when you will need your next colonoscopy. Your doctor can help you decide how often you need to be checked. This will depend on the results of your test and your risk for colorectal cancer. After the test, you may be bloated or have gas pains. You may need to pass gas. If a biopsy was done or a polyp was removed, you may have streaks of blood in your stool (feces) for a few days. This care sheet gives you a general idea about how long it will take for you to recover. But each person recovers at a different pace. Follow the steps below to get better as quickly as possible. How can you care for yourself at home? Activity  ? · Rest when you feel tired. ? · You can do your normal activities when it feels okay to do so. Diet  ? · Follow your doctor's directions for eating. ? · Unless your doctor has told you not to, drink plenty of fluids. This helps to replace the fluids that were lost during the colon prep. ? · Do not drink alcohol. Medicines  ? · Your doctor will tell you if and when you can restart your medicines. He or she will also give you instructions about taking any new medicines. ? · If you take blood thinners, such as warfarin (Coumadin), clopidogrel (Plavix), or aspirin, be sure to talk to your doctor. He or she will tell you if and when to start taking those medicines again. Make sure that you understand exactly what your doctor wants you to do. ? · If polyps were removed or a biopsy was done during the test, your doctor may tell you not to take aspirin or other anti-inflammatory medicines for a few days.  These include ibuprofen (Advil, Motrin) and naproxen (Ruddy). Other instructions  ? · For your safety, do not drive or operate machinery until the medicine wears off and you can think clearly. Your doctor may tell you not to drive or operate machinery until the day after your test.   ? · Do not sign legal documents or make major decisions until the medicine wears off and you can think clearly. The anesthesia can make it hard for you to fully understand what you are agreeing to. Follow-up care is a key part of your treatment and safety. Be sure to make and go to all appointments, and call your doctor if you are having problems. It's also a good idea to know your test results and keep a list of the medicines you take. When should you call for help? Call 911 anytime you think you may need emergency care. For example, call if:  ? · You passed out (lost consciousness). ? · You pass maroon or bloody stools. ? · You have trouble breathing. ?Call your doctor now or seek immediate medical care if:  ? · You have pain that does not get better after you take pain medicine. ? · You are sick to your stomach or cannot drink fluids. ? · You have new or worse belly pain. ? · You have blood in your stools. ? · You have a fever. ? · You cannot pass stools or gas. ? Watch closely for changes in your health, and be sure to contact your doctor if you have any problems. Where can you learn more? Go to http://ly-silke.info/. Enter E264 in the search box to learn more about \"Colonoscopy: What to Expect at Home. \"  Current as of: May 12, 2017  Content Version: 11.4  © 1942-5354 Healthwise, Incorporated. Care instructions adapted under license by [employer] (which disclaims liability or warranty for this information). If you have questions about a medical condition or this instruction, always ask your healthcare professional. [address] any warranty or liability for your use of this information.     DISCHARGE SUMMARY from Nurse    PATIENT INSTRUCTIONS:    After general anesthesia or intravenous sedation, for 24 hours or while taking prescription Narcotics:  · Limit your activities  · Do not drive and operate hazardous machinery  · Do not make important personal or business decisions  · Do  not drink alcoholic beverages  · If you have not urinated within 8 hours after discharge, please contact your surgeon on call. Report the following to your surgeon:  · Excessive pain, swelling, redness or odor of or around the surgical area  · Temperature over 100.5  · Nausea and vomiting lasting longer than 4 hours or if unable to take medications  · Any signs of decreased circulation or nerve impairment to extremity: change in color, persistent  numbness, tingling, coldness or increase pain  · Any questions    What to do at Home:  Recommended activity: Activity as tolerated and no driving for today. These are general instructions for a healthy lifestyle:    No smoking/ No tobacco products/ Avoid exposure to second hand smoke  Surgeon General's Warning:  Quitting smoking now greatly reduces serious risk to your health. Obesity, smoking, and sedentary lifestyle greatly increases your risk for illness    A healthy diet, regular physical exercise & weight monitoring are important for maintaining a healthy lifestyle    You may be retaining fluid if you have a history of heart failure or if you experience any of the following symptoms:  Weight gain of 3 pounds or more overnight or 5 pounds in a week, increased swelling in our hands or feet or shortness of breath while lying flat in bed. Please call your doctor as soon as you notice any of these symptoms; do not wait until your next office visit.     Recognize signs and symptoms of STROKE:    F-face looks uneven    A-arms unable to move or move unevenly    S-speech slurred or non-existent    T-time-call 911 as soon as signs and symptoms begin-DO NOT go       Back to bed or wait to see if you get better-TIME IS BRAIN. Warning Signs of HEART ATTACK     Call 911 if you have these symptoms:   Chest discomfort. Most heart attacks involve discomfort in the center of the chest that lasts more than a few minutes, or that goes away and comes back. It can feel like uncomfortable pressure, squeezing, fullness, or pain.  Discomfort in other areas of the upper body. Symptoms can include pain or discomfort in one or both arms, the back, neck, jaw, or stomach.  Shortness of breath with or without chest discomfort.  Other signs may include breaking out in a cold sweat, nausea, or lightheadedness. Don't wait more than five minutes to call 911 - MINUTES MATTER! Fast action can save your life. Calling 911 is almost always the fastest way to get lifesaving treatment. Emergency Medical Services staff can begin treatment when they arrive -- up to an hour sooner than if someone gets to the hospital by car. The discharge information has been reviewed with the patient. The patient verbalized understanding. Discharge medications reviewed with the patient and appropriate educational materials and side effects teaching were provided. Patient armband removed and given to patient to take home. Patient was informed of the privacy risks if armband lost or stolen    ______________________________  _____________________________________________________________________________________________________  RECOMMENDATIONS:  1. Referral to Dr Jeffrey Stern for surgical removal of uncomfortable,bleeding inflammed skin tag/atypical condyloma  2. Next colonoscopy 5 years b/o adenomas in the past  3. Start Methylcellulose tabs 2 in AM and 2 in PM daily to regulate bowel function.

## 2018-05-23 NOTE — ANESTHESIA POSTPROCEDURE EVALUATION
Post-Anesthesia Evaluation and Assessment    Patient: Chery Steele MRN: [id number]  SSN: [id number]    YOB: 1960  Age: 65 y.o. Sex: female       Cardiovascular Function/Vital Signs  Visit Vitals    BP 95/72    Pulse (!) 54    Temp 36.6 °C (97.8 °F)    Resp 18    Ht 5' 6\" (1.676 m)    Wt 89.2 kg (196 lb 9 oz)    SpO2 96%    Breastfeeding No    BMI 31.73 kg/m2       Patient is status post No value filed. anesthesia for Procedure(s):  COLONOSCOPY. Nausea/Vomiting: None    Postoperative hydration reviewed and adequate. Pain:  Pain Scale 1: Numeric (0 - 10) (05/23/18 1415)  Pain Intensity 1: 0 (05/23/18 1415)   Managed    Neurological Status: At baseline    Mental Status and Level of Consciousness: Alert and oriented     Pulmonary Status:   O2 Device: Room air (05/23/18 1415)   Adequate oxygenation and airway patent    Complications related to anesthesia: None    Post-anesthesia assessment completed.  No concerns    Signed By: Millie Jacob CRNA     May 23, 2018

## 2018-05-23 NOTE — PROCEDURES
Colonoscopy Report    Patient: Lisette Morgan MRN: [id number]  SSN: [id number]    YOB: 1960  Age: 65 y.o. Sex: female      Date of Procedure: 5/23/2018    IMPRESSION:  1. Large, inflammed skin tag less likely atypical condyloma with superficial erosion at the external anus  2. 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 Mild diverticulosis sigmoid and distal left colon  4. Normal colonic mucosa from the dentate line to the cecum---no AVM, mass, colitis, polyp  5. No significant hemorrhoids or typical recurrent condyloma    RECOMMENDATIONS:  1. Referral to Dr Naz Meneses for surgical removal of uncomfortable,bleeding inflammed skin tag/atypical condyloma  2. Next colonoscopy 5 years b/o adenomas in the past    Indication:  Rectal bleeding, personal history of anal condyloma and adenomatous polyps  History: The history and physical exam were reviewed and updated. Procedure Performed: Colonoscopy   Endoscopist: Vandana Ambrosio MD  Assistant: Endoscopy Technician-1: Scott Pollack  Endoscopy RN-1: Bjorn Hinojosa RN   ASA: ASA 2 - Patient with mild systemic disease with no functional limitations  Mallampati Score: II (soft palate, uvula, fauces visible)  Sedation:  MAC anesthesia  Endoscope: [id number]  Extent of Examination:terminal ileum  Quality of Preparation: excellent    Technique: The procedure was discussed with the patient including risks, benefits, alternatives including risks of IV sedation, bleeding, perforation and missed polyp. A safety timeout was performed. The patient was placed in left lateral position. The patient was given incremental doses of IV medication to achieve moderate  sedation. The patients vital signs were monitored at all times including heart rate, rhythm, blood pressure and oxygen saturation.   When adequate sedation was achieved a perianal inspection and a digital rectal exam were performed. The video colonoscope was introduced into the rectum and advanced under direct vision up to the cecum and into the terminal ileal ordfice. The cecum was identified by IC valve, appendiceal orifice and convergence of the tineal folds. Careful examination of the colonic mucosa was then performed on slow withdrawal of the endoscope. Retroflexion was performed in the rectum and the distal rectum visualized as was the anal canal.  The patient tolerated the procedure very well and was transferred to recovery area. Findings:  See impression above  EBL: minimal  Specimen:none    [facility].  MD Barry, [facility], [address]  May 23, 2018  2:12 PM

## 2018-05-23 NOTE — ANESTHESIA PREPROCEDURE EVALUATION
Anesthetic History   No history of anesthetic complications            Review of Systems / Medical History  Patient summary reviewed and pertinent labs reviewed    Pulmonary  Within defined limits                 Neuro/Psych         Psychiatric history     Cardiovascular    Hypertension      CHF  Dysrhythmias : SVT  Past MI and CAD    Exercise tolerance: >4 METS  Comments: Heart transplant '99  TVR   GI/Hepatic/Renal  Within defined limits              Endo/Other        Arthritis     Other Findings   Comments: Documentation of current medication  Current medications obtained, documented and obtained? YES      Risk Factors for Postoperative nausea/vomiting:       History of postoperative nausea/vomiting? NO       Female? YES       Motion sickness? NO       Intended opioid administration for postoperative analgesia? NO      Smoking Abstinence:  Current Smoker? NO  Elective Surgery? YES  Seen preoperatively by anesthesiologist or proxy prior to day of surgery? YES  Pt abstained from smoking 24 hours prior to anesthesia?  N/A    Preventive care/screening for High Blood Pressure:  Aged 18 years and older: YES  Screened for high blood pressure: YES  Patients with high blood pressure referred to primary care provider   for BP management: YES         Physical Exam    Airway  Mallampati: II  TM Distance: 4 - 6 cm  Neck ROM: normal range of motion   Mouth opening: Normal     Cardiovascular  Regular rate and rhythm,  S1 and S2 normal,  no murmur, click, rub, or gallop  Rhythm: regular  Rate: normal         Dental    Dentition: Lower dentition intact and Upper dentition intact     Pulmonary  Breath sounds clear to auscultation               Abdominal  GI exam deferred       Other Findings            Anesthetic Plan    ASA: 3  Anesthesia type: MAC          Induction: Intravenous  Anesthetic plan and risks discussed with: Patient

## 2018-05-24 NOTE — ADDENDUM NOTE
Addendum  created 05/23/18 2025 by Drew Britt MD    Anesthesia Attestations filed, Sign clinical note

## 2018-05-24 NOTE — ANESTHESIA POSTPROCEDURE EVALUATION
Post-Anesthesia Evaluation and Assessment    Patient: Corrina Hickman MRN: [id number]  SSN: [id number]    YOB: 1960  Age: 65 y.o. Sex: female       Cardiovascular Function/Vital Signs  Visit Vitals    BP 95/72    Pulse (!) 54    Temp 36.6 °C (97.8 °F)    Resp 18    Ht 5' 6\" (1.676 m)    Wt 89.2 kg (196 lb 9 oz)    SpO2 96%    Breastfeeding No    BMI 31.73 kg/m2       Patient is status post No value filed. anesthesia for Procedure(s):  COLONOSCOPY. Nausea/Vomiting: None    Postoperative hydration reviewed and adequate. Pain:  Pain Scale 1: Numeric (0 - 10) (05/23/18 1415)  Pain Intensity 1: 0 (05/23/18 1415)   Managed    Neurological Status: At baseline    Mental Status and Level of Consciousness: Arousable    Pulmonary Status:   O2 Device: Room air (05/23/18 1415)   Adequate oxygenation and airway patent    Complications related to anesthesia: None    Post-anesthesia assessment completed.  No concerns    Signed By: Melva Dover MD     May 23, 2018

## 2018-06-05 ENCOUNTER — OFFICE VISIT (OUTPATIENT)
Dept: SURGERY | Age: 58
End: 2018-06-05

## 2018-06-05 VITALS
RESPIRATION RATE: 18 BRPM | DIASTOLIC BLOOD PRESSURE: 72 MMHG | HEART RATE: 55 BPM | WEIGHT: 197.4 LBS | OXYGEN SATURATION: 93 % | TEMPERATURE: 98 F | HEIGHT: 66 IN | SYSTOLIC BLOOD PRESSURE: 128 MMHG | BODY MASS INDEX: 31.72 KG/M2

## 2018-06-05 DIAGNOSIS — K64.8 INTERNAL AND EXTERNAL PROLAPSED HEMORRHOIDS: Primary | ICD-10-CM

## 2018-06-05 NOTE — PATIENT INSTRUCTIONS
If you have any questions or concerns about today's appointment, the verbal and/or written instructions you were given for follow up care, please call our office at [phone]. [facility] - [address], [address] [place] [address]    [phone] office  [phone]        [facility] - [address]    [phone] office main line  [phone] main fax                  PATIENT PRE AND POST [address]: [facility] [address]  [phone]    Before Surgery Instructions:   1) You must have someone available to drive you to and from your procedure and stay with you for the first 24 hours. 2) It is very important that you have nothing to eat or drink after midnight the night before your surgery. This includes chewing gum or sucking on hard candy. Take only heart, blood pressure and cholesterol medications the morning of surgery with only a sip of water. 3) Please stop taking Plavix 10 days prior to your surgery. Stop taking Coumadin 5 days prior to your surgery. Stop taking all Aspirin or Aspirin containing products 7 days prior to your surgery. Stop taking Advil, Motrin, Aleve, and etc. 3 days prior to your surgery. 4) If you take any diabetic medications please consult with your primary care physician on how to take them on the day of your surgery  5) Please stop all Herbal products 2 weeks prior to your surgery. 6) Please arrive at the hospital 2 hours prior to your surgery, unless you have been otherwise instructed. 7) Patients having an operation on their colon will be given a separate instruction sheet on their Bowel Prep. 8) For any pre-operative work up check in at the main entrance to [facility], and then go to Patient Registration.  These studies are done on a walk in basis they are open from 7:00am to 5:00pm Monday through Friday. 9) Please wash your surgical site the morning of your surgery with soap and water. 10) If you are of child bearing age you will have pregnancy test done the morning of your surgery as soon as you arrive. 11) You may be contacted to change your surgery time. At times this is necessary due to equipment or staffing needs. Surgery Date and Time:        July 13,2018                 at     9:00     am         Please check in at [facility], enter through the Emergency Room entrance and go up to the second floor. Please check in by     7:00    am  the day of your surgery. You may contact [place] with any questions at [phone]. After Surgery Instructions: You will need to be seen in the office for a follow-up visit 7-14 days after your surgery. Please call after you have had the procedure to make this appointment. Unless otherwise instructed, you may remove your outer bandage and shower 48 hours after your surgery. If you develop a fever greater than 101, have any significant drainage, bleeding, swelling and/or pus of the wound. Please call our office immediately.

## 2018-06-05 NOTE — MR AVS SNAPSHOT
[facility] 
 
 
 [address] [facility] [address] [address] 
[phone] Patient: Governor Ayon MRN: [id number] PIT:2/0/2744 Visit Information Date & Time Provider Department Dept. Phone Encounter #  
 6/5/2018  3:00 PM Guero Willson MD [facility] [phone] [id number] Your Appointments 7/23/2018 10:00 AM  
POST OP with Guero Willson MD  
[address] [facility]) Appt Note: post op from 06-13 surgery [id number] [facility] [address] [place]  
  
   
 [id number] [place] [address] Upcoming Health Maintenance Date Due Hepatitis C Screening 1960 Pneumococcal 19-64 Highest Risk (1 of 3 - PCV13) 6/2/1980 DTaP/Tdap/Td series (1 - Tdap) 6/2/1982 BREAST CANCER SCRN MAMMOGRAM 6/2/2011 FOBT Q 1 YEAR AGE 50-75 6/2/2011 MEDICARE YEARLY EXAM 3/20/2018 Influenza Age 5 to Adult 8/1/2018 PAP AKA CERVICAL CYTOLOGY 4/30/2021 Allergies as of 6/5/2018  Review Complete On: 6/5/2018 By: Guero Willson MD  
  
 Severity Noted Reaction Type Reactions Latex, Natural Rubber Medium 12/11/2012   Intolerance Rash Dobutamine High 12/11/2012   Systemic Anaphylaxis Imuran [Azathioprine] High 12/11/2012   Systemic Other (comments) Bad liver trouble Other Medication High 12/11/2012   Systemic Hives  
 electrodes Nsaids (Non-steroidal Anti-inflammatory Drug) Medium 12/11/2012   Intolerance Other (comments) Contrary to immunosuppressants Avelox [Moxifloxacin]  12/11/2012   Not Verified Other (comments)  
 unknown Milrinone  10/24/2014    Other (comments) Migraine headache Other Plant, Animal, Environmental  10/24/2014    Rash Allergic to band-aids, detergent, deodorant, bath soap Tape [Adhesive]  10/24/2014    Hives, Rash, Contact Dermatitis, Other (comments) Scars Copper Low   Rash Current Immunizations  Never Reviewed No immunizations on file. Not reviewed this visit You Were Diagnosed With   
  
 Codes Comments Internal and external prolapsed hemorrhoids    -  Primary ICD-10-CM: C15.3 ICD-9-CM: 455.2, 455.5 Vitals BP Pulse Temp Resp Height(growth percentile) Weight(growth percentile) 128/72 (!) 55 98 °F (36.7 °C) (Oral) 18 5' 6\" (1.676 m) 197 lb 6.4 oz (89.5 kg) SpO2 BMI OB Status Smoking Status 93% 31.86 kg/m2 Postmenopausal Former Smoker Vitals History BMI and BSA Data Body Mass Index Body Surface Area  
 31.86 kg/m 2 2.04 m 2 Your Updated Medication List  
  
   
This list is accurate as of 6/5/18  3:57 PM.  Always use your most recent med list.  
  
  
  
  
 acetaminophen 325 mg tablet Commonly known as:  TYLENOL  
325 mg.  
  
 apixaban 5 mg tablet Commonly known as:  ELIQUIS  
5 mg. aspirin delayed-release 81 mg tablet Take 81 mg by mouth daily. ATIVAN 1 mg tablet Generic drug:  LORazepam  
Take 1 mg by mouth three (3) times daily. cetirizine 10 mg tablet Commonly known as:  ZYRTEC 10 mg.  
  
 furosemide 20 mg tablet Commonly known as:  LASIX  
20 mg.  
  
 hydrOXYzine HCl 25 mg tablet Commonly known as:  ATARAX 25 mg.  
  
 lisinopril 5 mg tablet Commonly known as:  PRINIVIL, ZESTRIL  
5 mg. LYRICA 200 mg capsule Generic drug:  pregabalin Take 200 mg by mouth three (3) times daily. MIRALAX 17 gram packet Generic drug:  polyethylene glycol Take 8.5 g by mouth daily as needed. mycophenolate 500 mg tablet Commonly known as:  CELLCEPT Take 250 mg by mouth two (2) times a day. NEORAL 25 mg capsule Generic drug:  cycloSPORINE modified Take 50 mg by mouth two (2) times a day. pravastatin 20 mg tablet Commonly known as:  PRAVACHOL 40 mg.  
  
 * TRILEPTAL 150 mg tablet Generic drug:  OXcarbazepine Take  by mouth every morning. * TRILEPTAL 300 mg tablet Generic drug:  OXcarbazepine Take 450 mg by mouth nightly. WELLBUTRIN  mg SR tablet Generic drug:  buPROPion SR Take 200 mg by mouth two (2) times a day. Indications: DEPRESSION  
  
 * Notice: This list has 2 medication(s) that are the same as other medications prescribed for you. Read the directions carefully, and ask your doctor or other care provider to review them with you. We Performed the Following IN ANOSCOPY DX W/COLLJ SPEC BR/WA SPX WHEN PRFRMD [id number] CPT(R)] Patient Instructions If you have any questions or concerns about today's appointment, the verbal and/or written instructions you were given for follow up care, please call our office at [phone]. [facility] - [address], [address] 
 
[phone] office [phone]  [address], [address] [facility] [address] 
 
[phone] office main line [phone] main fax PATIENT PRE AND POST OPERATIVE INSTRUCTIONS Hospital: [address] 
[phone] Before Surgery Instructions:  
1) You must have someone available to drive you to and from your procedure and stay with you for the first 24 hours. 2) It is very important that you have nothing to eat or drink after midnight the night before your surgery. This includes chewing gum or sucking on hard candy. Take only heart, blood pressure and cholesterol medications the morning of surgery with only a sip of water. 3) Please stop taking Plavix 10 days prior to your surgery. Stop taking Coumadin 5 days prior to your surgery. Stop taking all Aspirin or Aspirin containing products 7 days prior to your surgery. Stop taking Advil, Motrin, Aleve, and etc. 3 days prior to your surgery. 4) If you take any diabetic medications please consult with your primary care physician on how to take them on the day of your surgery 5) Please stop all Herbal products 2 weeks prior to your surgery. 6) Please arrive at the hospital 2 hours prior to your surgery, unless you have been otherwise instructed. 7) Patients having an operation on their colon will be given a separate instruction sheet on their Bowel Prep. 8) For any pre-operative work up check in at the main entrance to [address] [place] [address], and then go to Patient Registration. These studies are done on a walk in basis they are open from 7:00am to 5:00pm Monday through Friday. 9) Please wash your surgical site the morning of your surgery with soap and water. 10) If you are of child bearing age you will have pregnancy test done the morning of your surgery as soon as you arrive. 11) You may be contacted to change your surgery time. At times this is necessary due to equipment or staffing needs. Surgery Date and Time:        July 13,2018                 at     9:00     am      
 
Please check in at [facility], enter through the Emergency Room entrance and go up to the second floor. Please check in by     7:00    am  the day of your surgery. You may contact Lara Medina with any questions at [phone]. After Surgery Instructions: You will need to be seen in the office for a follow-up visit 7-14 days after your surgery. Please call after you have had the procedure to make this appointment. Unless otherwise instructed, you may remove your outer bandage and shower 48 hours after your surgery. If you develop a fever greater than 101, have any significant drainage, bleeding, swelling and/or pus of the wound. Please call our office immediately. Introducing [facility]!    
 [employer] introduces [employer] patient portal. Now you can access parts of your medical record, email your doctor's office, and request medication refills online. 1. In your internet browser, go to [email or web address] 2. Click on the First Time User? Click Here link in the Sign In box. You will see the New Member Sign Up page. 3. Enter your Viewpoint Access Code exactly as it appears below. You will not need to use this code after youve completed the sign-up process. If you do not sign up before the expiration date, you must request a new code. · Viewpoint Access Code: [email or web address] Expires: 8/19/2018  5:49 PM 
 
4. Enter the last four digits of your Social Security Number (xxxx) and Date of Birth (mm/dd/yyyy) as indicated and click Submit. You will be taken to the next sign-up page. 5. Create a Viewpoint ID. This will be your Viewpoint login ID and cannot be changed, so think of one that is secure and easy to remember. 6. Create a Viewpoint password. You can change your password at any time. 7. Enter your Password Reset Question and Answer. This can be used at a later time if you forget your password. 8. Enter your e-mail address. You will receive e-mail notification when new information is available in [address] 9. Click Sign Up. You can now view and download portions of your medical record. 10. Click the Download Summary menu link to download a portable copy of your medical information. If you have questions, please visit the Frequently Asked Questions section of the Viewpoint website. Remember, Viewpoint is NOT to be used for urgent needs. For medical emergencies, dial 911. Now available from your iPhone and Android! Please provide this summary of care documentation to your next provider. Your primary care clinician is listed as Beth Rodríguez. If you have any questions after today's visit, please call [phone].

## 2018-06-05 NOTE — PROGRESS NOTES
[facility]  Colon and Rectal Surgery  [address] [address]              Colon and Rectal Surgery          Patient: Joana Spurling  MRN: [id number]  Date: 6/5/2018     Age:  63 y.o.,      Sex: female    YOB: 1960      Subjective    Ms. Evelyne Fregoso is an 63 y.o. female referred by Dr. Mahamed Eugene. Her current symptoms include progressive anal pain and discomfort with intermittlett bright red anal outlet bleeding from a large hemorrhoid disease.  reports symptoms have presented for 2 years.  denies associated fever. A history of inflammatory bowel disease has not been reported. The patient underwent a colonoscopy exam by Dr. Mahamed Eugene for polyp surveillance purposes on 2/23/2018. This exam showed a large, inflammed skin tag less likely atypical condyloma with superficial erosion at the external anus. stenosis of the anus without mass---scar from previous surgical removal of anal condyloma---trauma by the scope during the colonoscopy, as well as mild diverticulosis sigmoid and distal left colon. The patient denies any change in bowel habits, weight changes, nor any abdominal pain. The patient also denies constipation, vomiting, reflux and nausea. Bowel habits are reported as loose to regular, and she is taking Miralax regularly. The patient describes undergoing laser fulguration of anal condyloma in 2007. The family history is negative for colon cancer/polyps, other GI malignancies, nor inflammatory bowel diseases.         Past Medical History:   Diagnosis Date    Arthritis     Avascular necrosis of femoral head, left ([address]     CAD (coronary artery disease)     Cancer (HCC)     Vaginal    GERD (gastroesophageal reflux disease)     Hypercholesteremia     Hypertension     Ill-defined condition     fractured foot, Bilateral    Ill-defined condition     wrist fracture    Psychiatric disorder     Depression, Anxiety, Bipolar    S/P radiation therapy     Status post chemotherapy        Past Surgical History:   Procedure Laterality Date    COLONOSCOPY N/A 5/23/2018    COLONOSCOPY performed by Micheal Sorensen MD at [address] [facility] ARTHROSCOPY, DX      HX CYST REMOVAL  2006    HX HEART VALVE SURGERY  2013    Tricuspid valve    TRANSPLANTATION OF HEART  1999       Allergies   Allergen Reactions    Latex, Natural Rubber Rash    Dobutamine Anaphylaxis    Imuran [Azathioprine] Other (comments)     Bad liver trouble    Other Medication Hives     electrodes    Nsaids (Non-Steroidal Anti-Inflammatory Drug) Other (comments)     Contrary to immunosuppressants    Avelox [Moxifloxacin] Other (comments)     unknown    Milrinone Other (comments)     Migraine headache      Other Plant, Animal, Environmental Rash     Allergic to band-aids, detergent, deodorant, bath soap    Tape [Adhesive] Hives, Rash, Contact Dermatitis and Other (comments)     Scars      Copper Rash       Prior to Admission medications    Medication Sig Start Date End Date Taking? Authorizing Provider   acetaminophen (TYLENOL) 325 mg tablet 325 mg. Yes Historical Provider   apixaban (ELIQUIS) 5 mg tablet 5 mg. 2/19/18  Yes Historical Provider   cetirizine (ZYRTEC) 10 mg tablet 10 mg. 2/27/18  Yes Historical Provider   furosemide (LASIX) 20 mg tablet 20 mg. 2/19/18  Yes Historical Provider   pravastatin (PRAVACHOL) 20 mg tablet 40 mg. 2/19/18  Yes Historical Provider   lisinopril (PRINIVIL, ZESTRIL) 5 mg tablet 5 mg. 2/19/18  Yes Historical Provider   hydrOXYzine HCl (ATARAX) 25 mg tablet 25 mg. 2/19/18  Yes Historical Provider   pregabalin (LYRICA) 200 mg capsule Take 200 mg by mouth three (3) times daily. Yes Historical Provider   LORazepam (ATIVAN) 1 mg tablet Take 1 mg by mouth three (3) times daily. Yes Historical Provider   OXcarbazepine (TRILEPTAL) 300 mg tablet Take 450 mg by mouth nightly. Yes Historical Provider   aspirin delayed-release 81 mg tablet Take 81 mg by mouth daily. Yes Historical Provider   mycophenolate (CELLCEPT) 500 mg tablet Take 250 mg by mouth two (2) times a day. Yes Historical Provider   cycloSPORINE modified (NEORAL) 25 mg capsule Take 50 mg by mouth two (2) times a day. Yes Historical Provider   bu[facility]ion SR [facility]) 200 mg SR tablet Take 200 mg by mouth two (2) times a day. Indications: DEPRESSION   Yes Historical Provider   polyethylene glycol (MIRALAX) 17 gram packet Take 8.5 g by mouth daily as needed. Historical Provider   OXcarbazepine (TRILEPTAL) 150 mg tablet Take  by mouth every morning. Linda Lucas MD       Current Outpatient Prescriptions   Medication Sig Dispense Refill    acetaminophen (TYLENOL) 325 mg tablet 325 mg.      apixaban (ELIQUIS) 5 mg tablet 5 mg.  cetirizine (ZYRTEC) 10 mg tablet 10 mg.      furosemide (LASIX) 20 mg tablet 20 mg.  pravastatin (PRAVACHOL) 20 mg tablet 40 mg.      lisinopril (PRINIVIL, ZESTRIL) 5 mg tablet 5 mg.  hydrOXYzine HCl (ATARAX) 25 mg tablet 25 mg.  pregabalin (LYRICA) 200 mg capsule Take 200 mg by mouth three (3) times daily.  LORazepam (ATIVAN) 1 mg tablet Take 1 mg by mouth three (3) times daily.  OXcarbazepine (TRILEPTAL) 300 mg tablet Take 450 mg by mouth nightly.  aspirin delayed-release 81 mg tablet Take 81 mg by mouth daily.  mycophenolate (CELLCEPT) 500 mg tablet Take 250 mg by mouth two (2) times a day.  cycloSPORINE modified (NEORAL) 25 mg capsule Take 50 mg by mouth two (2) times a day.  buPROPion SR (WELLBUTRIN SR) 200 mg SR tablet Take 200 mg by mouth two (2) times a day. Indications: DEPRESSION      polyethylene glycol (MIRALAX) 17 gram packet Take 8.5 g by mouth daily as needed.  OXcarbazepine (TRILEPTAL) 150 mg tablet Take  by mouth every morning.          Social History     Social History    Marital status: UNKNOWN     Spouse name: N/A    Number of children: N/A    Years of education: N/A     Occupational History    Not on file. Social History Main Topics    Smoking status: Former Smoker     Quit date: 1/23/2015    Smokeless tobacco: Never Used    Alcohol use No    Drug use: No    Sexual activity: Not on file     Other Topics Concern    Not on file     Social History Narrative       Family History   Problem Relation Age of Onset    Heart Disease Father     Heart Disease Brother            Review of Systems:    A comprehensive review of systems was negative except for: Musculoskeletal: positive for arthralgias and stiff joints  Behvioral/Psych: positive for anxiety and depression    Objective:        Visit Vitals    /72    Pulse (!) 55    Temp 98 °F (36.7 °C) (Oral)    Resp 18    Ht 5' 6\" (1.676 m)    Wt 89.5 kg (197 lb 6.4 oz)    SpO2 93%    BMI 31.86 kg/m2       Physical Exam:   GENERAL: alert, cooperative, no distress, appears stated age  LUNG: clear to auscultation bilaterally  HEART: regular rate and rhythm  EXTREMITIES:  extremities normal, atraumatic, no cyanosis or edema     Anorectal:  With the patient in the prone position the anus appeared abnormal with findings of an extremely large (3-4 cm) prolapsed external and internal hemorrhoid disease in the left lateral quadrant. There was superficial erosion/ulceration at the external surface. Digital rectal examination revealed increased sphincter tone and squeeze pressure. Palpation revealed No Masses. Anoscopy revealed the large hemorrhoid disease findings with moderate inflammation and no active bleeding noted. Assessment / Plan    Ms. Jennifer Alcantara is an 63 y.o. female with a very large prolapsed external and internal hemorrhoid disease. I discussed the treatment options with the patient carefully. Given the severity of the disease, I recommended expedient surgical management. The patient was in full agreement.     I discussed the details of the procedure as well as the risks of surgery including bleeding, infection, pain, anesthesia complications, possibility of recurrent disease, and the possibility of anal incontinence with any anal surgery. The patient is willing to accept the risks and would like to proceed with the surgery. Given the patient's extensive cardiac history and cardiac transplantation, I request a cardiac clearance form her Cardiologist at [facility]. Thank you for allowing me to participate in the patient's care.           Carie Mitchell MD, FACS, FASCRS  Colon and Rectal Surgery  [facility]  Office [phone]  Fax     [phone]  6/5/2018  3:35 PM

## 2018-07-09 ENCOUNTER — TELEPHONE (OUTPATIENT)
Dept: SURGERY | Age: 58
End: 2018-07-09

## 2018-07-09 NOTE — TELEPHONE ENCOUNTER
Rec'd call from pts daughter [facility] asking what antibiotics her mother should take prior to hemorrhoidectomy on 7/13/18 as well as when to stop eliquis. Per Dr. Chen Timmons ok to stop eliquis 3 days prior. Daughter is agreeable and verbalized understanding. Per Dr. Chen Timmons he would like to obtain recommendation for antibiotic from transplant infectious disease specialist given her transplant status. I have call advanced heart failure clinic and left  ms to obtain contact to transplant infectious disease specialist.     Will follow up.

## 2018-07-11 ENCOUNTER — TELEPHONE (OUTPATIENT)
Dept: SURGERY | Age: 58
End: 2018-07-11

## 2018-07-11 NOTE — TELEPHONE ENCOUNTER
Patient did call this morning ( I was on the other line) and left a voice message asking if they can park in front of the Emergency department building. I tried calling the patient but no answer so I left a voice mail stating yes they can park in front of the Emergency department and then enter through the ER door and take elevator to 2nd floor and check in at the ambulatory pavillion. I left my direct number for her to call me if there are other questions.

## 2018-07-11 NOTE — PERIOP NOTES
is a heart transplant patient who was tearful when I called her for PAT screening. She has been unable to clarify if she needs to take prophylactic oral antibiotics before surgery. I called and spoke with Dr. Ronnie Lee. She has been trying to reach heart transplant and left several messages but has not received any call backs. I paged Dr. Zoe Adams. I called [facility]  and was connected to transplant center. I was able to speak to Roula Seaman who is transplant coordinator. She will speak with Infectious disease and fax the recommendation for prophylactic antibiotics. Lalo Diaz did return my call and will follow up with transplant manager. 07/12/18- Unable to reach anyone from [facility]. Left message on [employer]. Spoke with Yoly Guadalupe and Dr. Josse Stern. They will call Lalo Diaz. No one received a written faxed from Roula Seaman with ID recommendation for prophylactic antibiotic.    07/12/2018- Henderson Pearson called and they were able to speak with infectious disease physician. Zosyn and Vanco will be given pre-op.

## 2018-07-12 ENCOUNTER — TELEPHONE (OUTPATIENT)
Dept: SURGERY | Age: 58
End: 2018-07-12

## 2018-07-12 ENCOUNTER — ANESTHESIA EVENT (OUTPATIENT)
Dept: SURGERY | Age: 58
End: 2018-07-12
Payer: MEDICARE

## 2018-07-12 RX ORDER — FUROSEMIDE 40 MG/1
40 TABLET ORAL 2 TIMES DAILY
COMMUNITY
Start: 2018-06-13

## 2018-07-12 NOTE — TELEPHONE ENCOUNTER
Received notification from [address] [place] [address], transplant coordinator, that they had gotten instructions from Dr. Flex Salmon (the ID director at [facility]) to give patient Zosyn 3.75 mg IV x1 in preop as well as 1 dose of vancomycin as per renal dosing in preop. Sukhwinder Lua NP will write the orders as per Dr. Gabby Bey instructions. Theopolis Sinks in [facility] was notified; orders will be faxed to her. Will include Dr. Restrepo Headings on this note so he is also aware of plan.

## 2018-07-13 ENCOUNTER — ANESTHESIA (OUTPATIENT)
Dept: SURGERY | Age: 58
End: 2018-07-13
Payer: MEDICARE

## 2018-07-13 ENCOUNTER — HOSPITAL ENCOUNTER (OUTPATIENT)
Age: 58
Setting detail: OUTPATIENT SURGERY
Discharge: HOME | End: 2018-07-13
Attending: COLON & RECTAL SURGERY | Admitting: COLON & RECTAL SURGERY
Payer: MEDICARE

## 2018-07-13 VITALS
OXYGEN SATURATION: 97 % | HEART RATE: 51 BPM | SYSTOLIC BLOOD PRESSURE: 119 MMHG | WEIGHT: 191.5 LBS | DIASTOLIC BLOOD PRESSURE: 60 MMHG | BODY MASS INDEX: 30.78 KG/M2 | TEMPERATURE: 97 F | HEIGHT: 66 IN | RESPIRATION RATE: 16 BRPM

## 2018-07-13 DIAGNOSIS — K64.8 INTERNAL AND EXTERNAL PROLAPSED HEMORRHOIDS: Primary | ICD-10-CM

## 2018-07-13 PROCEDURE — 74011250636 HC RX REV CODE- 250/636: Performed by: NURSE ANESTHETIST, CERTIFIED REGISTERED

## 2018-07-13 PROCEDURE — 74011250637 HC RX REV CODE- 250/637: Performed by: ANESTHESIOLOGY

## 2018-07-13 PROCEDURE — 74011250636 HC RX REV CODE- 250/636

## 2018-07-13 PROCEDURE — 88309 TISSUE EXAM BY PATHOLOGIST: CPT | Performed by: COLON & RECTAL SURGERY

## 2018-07-13 PROCEDURE — 74011000250 HC RX REV CODE- 250: Performed by: COLON & RECTAL SURGERY

## 2018-07-13 PROCEDURE — 88304 TISSUE EXAM BY PATHOLOGIST: CPT | Performed by: COLON & RECTAL SURGERY

## 2018-07-13 PROCEDURE — 77030011265 HC ELECTRD BLD HEX COVD -A: Performed by: COLON & RECTAL SURGERY

## 2018-07-13 PROCEDURE — 77030011640 HC PAD GRND REM COVD -A: Performed by: COLON & RECTAL SURGERY

## 2018-07-13 PROCEDURE — 77030010120 HC SHR COAG HARMO J&J -E: Performed by: COLON & RECTAL SURGERY

## 2018-07-13 PROCEDURE — 77030032490 HC SLV COMPR SCD KNE COVD -B: Performed by: COLON & RECTAL SURGERY

## 2018-07-13 PROCEDURE — 77030018823 HC SLV COMPR VENO -B: Performed by: COLON & RECTAL SURGERY

## 2018-07-13 PROCEDURE — 74011250637 HC RX REV CODE- 250/637: Performed by: NURSE ANESTHETIST, CERTIFIED REGISTERED

## 2018-07-13 PROCEDURE — 74011250636 HC RX REV CODE- 250/636: Performed by: COLON & RECTAL SURGERY

## 2018-07-13 PROCEDURE — 77030018836 HC SOL IRR NACL ICUM -A: Performed by: COLON & RECTAL SURGERY

## 2018-07-13 PROCEDURE — 76210000026 HC REC RM PH II 1 TO 1.5 HR: Performed by: COLON & RECTAL SURGERY

## 2018-07-13 PROCEDURE — 76010000160 HC OR TIME 0.5 TO 1 HR INTENSV-TIER 1: Performed by: COLON & RECTAL SURGERY

## 2018-07-13 PROCEDURE — 77030002888 HC SUT CHRMC J&J -A: Performed by: COLON & RECTAL SURGERY

## 2018-07-13 PROCEDURE — 84520 ASSAY OF UREA NITROGEN: CPT

## 2018-07-13 PROCEDURE — 76060000032 HC ANESTHESIA 0.5 TO 1 HR: Performed by: COLON & RECTAL SURGERY

## 2018-07-13 RX ORDER — OXYCODONE AND ACETAMINOPHEN 5; 325 MG/1; MG/1
1 TABLET ORAL
Qty: 20 TAB | Refills: 0 | Status: SHIPPED | OUTPATIENT
Start: 2018-07-13

## 2018-07-13 RX ORDER — FENTANYL CITRATE 50 UG/ML
INJECTION, SOLUTION INTRAMUSCULAR; INTRAVENOUS AS NEEDED
Status: DISCONTINUED | OUTPATIENT
Start: 2018-07-13 | End: 2018-07-13 | Stop reason: HOSPADM

## 2018-07-13 RX ORDER — MIDAZOLAM HYDROCHLORIDE 1 MG/ML
INJECTION, SOLUTION INTRAMUSCULAR; INTRAVENOUS AS NEEDED
Status: DISCONTINUED | OUTPATIENT
Start: 2018-07-13 | End: 2018-07-13 | Stop reason: HOSPADM

## 2018-07-13 RX ORDER — PROPOFOL 10 MG/ML
INJECTION, EMULSION INTRAVENOUS AS NEEDED
Status: DISCONTINUED | OUTPATIENT
Start: 2018-07-13 | End: 2018-07-13 | Stop reason: HOSPADM

## 2018-07-13 RX ORDER — FAMOTIDINE 20 MG/1
20 TABLET, FILM COATED ORAL ONCE
Status: COMPLETED | OUTPATIENT
Start: 2018-07-13 | End: 2018-07-13

## 2018-07-13 RX ORDER — BACITRACIN 500 [USP'U]/G
OINTMENT TOPICAL AS NEEDED
Status: DISCONTINUED | OUTPATIENT
Start: 2018-07-13 | End: 2018-07-13 | Stop reason: HOSPADM

## 2018-07-13 RX ORDER — LIDOCAINE HYDROCHLORIDE 10 MG/ML
0.1 INJECTION, SOLUTION EPIDURAL; INFILTRATION; INTRACAUDAL; PERINEURAL AS NEEDED
Status: DISCONTINUED | OUTPATIENT
Start: 2018-07-13 | End: 2018-07-13 | Stop reason: HOSPADM

## 2018-07-13 RX ORDER — SODIUM CHLORIDE 900 MG/100ML
INJECTION INTRAVENOUS
Status: DISCONTINUED
Start: 2018-07-13 | End: 2018-07-13 | Stop reason: HOSPADM

## 2018-07-13 RX ORDER — OXYCODONE AND ACETAMINOPHEN 5; 325 MG/1; MG/1
1 TABLET ORAL
Status: COMPLETED | OUTPATIENT
Start: 2018-07-13 | End: 2018-07-13

## 2018-07-13 RX ORDER — SODIUM CHLORIDE, SODIUM LACTATE, POTASSIUM CHLORIDE, CALCIUM CHLORIDE 600; 310; 30; 20 MG/100ML; MG/100ML; MG/100ML; MG/100ML
75 INJECTION, SOLUTION INTRAVENOUS CONTINUOUS
Status: DISCONTINUED | OUTPATIENT
Start: 2018-07-13 | End: 2018-07-13 | Stop reason: HOSPADM

## 2018-07-13 RX ORDER — LIDOCAINE HYDROCHLORIDE AND EPINEPHRINE 10; 10 MG/ML; UG/ML
INJECTION, SOLUTION INFILTRATION; PERINEURAL AS NEEDED
Status: DISCONTINUED | OUTPATIENT
Start: 2018-07-13 | End: 2018-07-13 | Stop reason: HOSPADM

## 2018-07-13 RX ORDER — PROPOFOL 10 MG/ML
INJECTION, EMULSION INTRAVENOUS
Status: DISCONTINUED | OUTPATIENT
Start: 2018-07-13 | End: 2018-07-13 | Stop reason: HOSPADM

## 2018-07-13 RX ADMIN — SODIUM CHLORIDE 1000 MG: 900 INJECTION, SOLUTION INTRAVENOUS at 08:27

## 2018-07-13 RX ADMIN — PROPOFOL 50 MCG/KG/MIN: 10 INJECTION, EMULSION INTRAVENOUS at 11:41

## 2018-07-13 RX ADMIN — PROPOFOL 50 MG: 10 INJECTION, EMULSION INTRAVENOUS at 11:20

## 2018-07-13 RX ADMIN — OXYCODONE HYDROCHLORIDE AND ACETAMINOPHEN 1 TABLET: 5; 325 TABLET ORAL at 13:02

## 2018-07-13 RX ADMIN — MIDAZOLAM HYDROCHLORIDE 2 MG: 1 INJECTION, SOLUTION INTRAMUSCULAR; INTRAVENOUS at 11:16

## 2018-07-13 RX ADMIN — FENTANYL CITRATE 100 MCG: 50 INJECTION, SOLUTION INTRAMUSCULAR; INTRAVENOUS at 11:18

## 2018-07-13 RX ADMIN — SODIUM CHLORIDE, SODIUM LACTATE, POTASSIUM CHLORIDE, AND CALCIUM CHLORIDE 75 ML/HR: 600; 310; 30; 20 INJECTION, SOLUTION INTRAVENOUS at 08:17

## 2018-07-13 RX ADMIN — FAMOTIDINE 20 MG: 20 TABLET ORAL at 08:17

## 2018-07-13 NOTE — PERIOP NOTES
Ceci Jean (Mother) will be here during entire surgery. Permission given to give medical info obtained.

## 2018-07-13 NOTE — ANESTHESIA POSTPROCEDURE EVALUATION
Post-Anesthesia Evaluation & Assessment    Visit Vitals    /78    Pulse 78    Temp 36.1 °C (97 °F)    Resp 12    Ht 5' 6\" (1.676 m)    Wt 86.9 kg (191 lb 8 oz)    SpO2 100%    BMI 30.91 kg/m2       Nausea/Vomiting: no nausea and no vomiting    Pain score (VAS): 0    Post-operative hydration adequate.     Mental status & Level of consciousness: orientation per pre-anesthetic level    Neurological status: moves all extremities, sensation grossly intact    Pulmonary status: airway patent, no supplemental oxygen required    Complications related to anesthesia: none    Additional comments:        Agustin Live CRNA  July 13, 2018

## 2018-07-13 NOTE — ANESTHESIA PREPROCEDURE EVALUATION
Anesthetic History   No history of anesthetic complications            Review of Systems / Medical History  Patient summary reviewed and pertinent labs reviewed    Pulmonary  Within defined limits                 Neuro/Psych         Psychiatric history     Cardiovascular    Hypertension      CHF    CAD    Exercise tolerance: >4 METS  Comments: S/p cardiac transplant '99   GI/Hepatic/Renal     GERD           Endo/Other        Obesity    Comments: H/o THC and remote cocaine abuse Other Findings   Comments: Documentation of current medication  Current medications obtained, documented and obtained? YES      Risk Factors for Postoperative nausea/vomiting:       History of postoperative nausea/vomiting?  no       Female? YES       Motion sickness? NO       Intended opioid administration for postoperative analgesia? YES      Smoking Abstinence:  Current Smoker? NO  Elective Surgery? YES  Seen preoperatively by anesthesiologist or proxy prior to day of surgery? YES  Pt abstained from smoking 24 hours prior to anesthesia?  N/A    Preventive care/screening for High Blood Pressure:  Aged 18 years and older: YES  Screened for high blood pressure: YES  Patients with high blood pressure referred to primary care provider   for BP management: YES           Physical Exam    Airway  Mallampati: II  TM Distance: 4 - 6 cm  Neck ROM: normal range of motion   Mouth opening: Normal     Cardiovascular  Regular rate and rhythm,  S1 and S2 normal,  no murmur, click, rub, or gallop  Rhythm: regular  Rate: normal         Dental    Dentition: Poor dentition, Lower dentition intact and Upper dentition intact     Pulmonary  Breath sounds clear to auscultation               Abdominal  GI exam deferred       Other Findings            Anesthetic Plan    ASA: 3  Anesthesia type: MAC          Induction: Intravenous  Anesthetic plan and risks discussed with: Patient

## 2018-07-13 NOTE — OP NOTES
[address] REPORT    Arnie Esquivel  MR#: [id number]  : 1960  ACCOUNT #: [de-identified]   DATE OF SERVICE: 2018    PREOPERATIVE DIAGNOSIS:  Prolapsed hemorrhoid disease. POSTOPERATIVE DIAGNOSES:  Large mass in the anal canal and perianal area with associated prolapsed hemorrhoid disease. PROCEDURE:  1. Examination under anesthesia. 2.  Excision of the large anal mass with en bloc external and internal hemorrhoidectomy. SURGEON:  Devan Mcpherson MD    ASSISTANT:  Nuris Martínez     ANESTHESIA:  MAC/local.    ASA class 3. INDICATIONS FOR SURGERY:  The patient is a 58-year-old female seen in clinic visit on 2018 for a progressive anal pain and bleeding secondary to hemorrhoid disease. The patient was noted to have a large prolapsed hemorrhoid disease with superficial erosion present. Because of the persistent bleeding, examination under anesthesia and hemorrhoidectomy was recommended. The patient unfortunately had a prolonged preoperative evaluation phase given her previous history of cardiac transplantation. She is here with recommendations for preoperative IV vancomycin and Zosyn perioperatively. The patient was informed of the indication for the procedure as well as the risks and complications. Specifically the details of the procedure as well as risks of surgery including bleeding, infection, pain, anesthesia complications, possibility of recurrent disease, and the possibility of anal incontinence issues with any anal surgery were discussed. The patient demonstrated understanding of surgical considerations, risks, benefits and alternatives and was noted to accept these risks and proceed with surgery. FINDINGS:  A very large 4.5 x 4 cm mass originating from the anal canal at the 9 o'clock position and protruding to the perianal region.   There was an associated large external and internal hemorrhoid disease associated with this immediately adjacent to the mass. The mass was concerning for a neoplasm. This had enlarged quite a bit since her last visit with me on 06/05/2018. PROCEDURE:  The patient brought to the operating room and placed on the operating table in the prone position after MAC anesthesia was successfully achieved by the anesthesiology service. The safety strap was carefully secured and pressure points were carefully padded. The patient was then prepped and draped in the standard sterile fashion. The pneumatic compression boots on the lower extremities were already in place. Next, the operative field was prepped and draped in standard sterile fashion. I initially injected approximately 20 mL of 1% lidocaine with epinephrine at the operative site for satisfactory local anesthesia. With the aid of the Shay retractor, the anal canal was thoroughly evaluated. The patient had a large mass with the adherent external and internal hemorrhoidal tissues, centered at the 9 o'clock position. There were few other sentinel skin tags which appeared completely benign, both posteriorly and on the right. I proceeded with excising the mass with en bloc surgical hemorrhoidectomy in the following fashion. An elliptical incision was made encompassing both hemorrhoidal tissues and the mass sharply. I used the Harmonic Focus to carefully dissect the mass from the underlying subcutaneous tissue first.  In continuity, the external hemorrhoid tissues were excised from the subcutaneous external sphincter muscle layer carefully. In continuity all of the internal hemorrhoid tissues were excised from the internal sphincter muscle layer. Care was taken to avoid any injury to the sphincter muscle layers. After hemostasis was noted to be satisfactory, the wound was thoroughly irrigated. The mucosal defect was then closed with a 3-0 chromic catgut suture in the running locked fashion.   The anoderm and the perianal skin defects were closed with another 3-0 chromic running suture. The anal canal was then again irrigated with sterile saline and hemostasis was noted to be quite satisfactory. A sterile dry dressing was then placed at the anal verge. The patient tolerated this procedure well and was transferred to recovery room in good condition. The needle and sponge counts were correct. ESTIMATED BLOOD LOSS:  5 mL. DRAINS:  None. TOTAL INTRAVENOUS FLUIDS:  900 mL. SPECIMENS:  Anal mass with associated hemorrhoids in the left lateral quadrant to pathology. COMPLICATIONS:  None.     IMPLANTS:  None      MD EMILIANO Coe / OSVALDO  D: 07/13/2018 12:30     T: 07/13/2018 15:24  JOB #: [id number]

## 2018-07-13 NOTE — H&P
[employer] [facility] [address] [facility], [address] [place], [address] [place] [address] Colon and Rectal Surgery History and Physical 
 
Patient: Juan Rushing  MRN: [id number]  SSN: [id number] YOB: 1960  Age: 63 y.o. Sex: female Ms. Kathryn Pham is an 63 y.o. female with a very large prolapsed external and internal hemorrhoid disease. Clinical exam showed an extremely large (3-4 cm) prolapsed external and internal hemorrhoid disease. The patient is here to proceed with surgery. Past Medical History:  
Diagnosis Date  Arthritis  Avascular necrosis of femoral head, left ([facility]  CAD (coronary artery disease)  Cancer ([facility] Vaginal  
 GERD (gastroesophageal reflux disease)  Hypercholesteremia  Hypertension  Ill-defined condition   
 fractured foot, Bilateral  
 Ill-defined condition   
 wrist fracture  Psychiatric disorder Depression, Anxiety, Bipolar  S/P radiation therapy  Status post chemotherapy Past Surgical History:  
Procedure Laterality Date  COLONOSCOPY N/A 5/23/2018 COLONOSCOPY performed by Ivon Baca MD at [facility] ENDOSCOPY  
 [address] CYST REMOVAL  2006  HX GYN Laser [address] VALVE SURGERY  2013 Tricuspid valve [facility] Allergies Allergen Reactions  Latex, Natural Rubber Rash  Dobutamine Anaphylaxis  Imuran [Azathioprine] Other (comments) Bad liver trouble  Other Medication Hives  
  electrodes  Copper Rash  Nsaids (Non-Steroidal Anti-Inflammatory Drug) Other (comments) Contrary to immunosuppressants  Avelox [Moxifloxacin] Other (comments)  
  unknown  Milrinone Other (comments) Migraine headache  Other Plant, Animal, Environmental Rash Allergic to band-aids, detergent, deodorant, bath soap  Tape [Adhesive] Hives, Rash, Contact Dermatitis and Other (comments) Current Facility-Administered Medications Medication Dose Route Frequency Provider Last Rate Last Dose  lidocaine (PF) (XYLOCAINE) 10 mg/mL (1 %) injection 0.1 mL  0.1 mL SubCUTAneous PRN Fabiano Aleman CRNA  lactated Ringers infusion  75 mL/hr IntraVENous CONTINUOUS Fabiano Aleman CRNA 75 mL/hr at 07/13/18 0817 75 mL/hr at 07/13/18 0817  
 vancomycin (VANCOCIN) 1,000 mg in 0.9% sodium chloride (MBP/ADV) 250 mL adv  1,000 mg IntraVENous ON CALL TO OR Sabrina Ernandez  mL/hr at 07/13/18 0827 1,000 mg at 07/13/18 0827  
 piperacillin-tazobactam (ZOSYN) 3.375 g in  ml premix/cmpd  3.375 g IntraVENous ON CALL TO OR Sabrina Ernandez MD      
 
 
 
Physical Examination Vitals:  
 07/12/18 0940 07/13/18 5118 BP:  119/56 Pulse:  (!) 51 Resp:  16 Temp:  98.3 °F (36.8 °C) SpO2:  97% Weight: 90.7 kg (200 lb) 86.9 kg (191 lb 8 oz) Height: 5' 6\" (1.676 m) 5' 6\" (1.676 m) Physical Exam:  
GENERAL: alert, cooperative, no distress, appears stated age LUNG: clear to auscultation bilaterally HEART: regular rate and rhythm ABDOMEN: soft, non-tender. Bowel sounds normal. No masses,  no organomegaly Assessment and Plan: 
 
 is an 63 y.o. female with a very large prolapsed external and internal hemorrhoid disease.  
  
I discussed the treatment options with the patient carefully. Given the severity of the disease, I recommended expedient surgical management. The patient was in full agreement. 
  
I discussed the details of the procedure as well as the risks of surgery including bleeding, infection, pain, anesthesia complications, possibility of recurrent disease, and the possibility of anal incontinence with any anal surgery. The patient is willing to accept the risks and would like to proceed with the surgery. Sabrina Ernandez MD, FACS, FASCRS Colon and Rectal Surgery [address] [place] [address] [facility] Office [phone] Fax     [phone] 7/13/2018  9:16 AM  
 
[address] [employer]

## 2018-07-13 NOTE — IP AVS SNAPSHOT
[address] [address] [address] 
[phone] Patient: Solo Auguste MRN: [id number] [place] [facility]:[birth year] About your hospitalization You were admitted on:  July 13, 2018 You last received care in the:  [facility] PHASE 2 RECOVERY You were discharged on:  July 13, 2018 Why you were hospitalized Your primary diagnosis was:  Not on File Follow-up Information Follow up With Details Comments Contact Info Bennett Pickering MD   [address] [place] [address]  
[address] [address] 
[phone] Emma Pena MD Schedule an appointment as soon as possible for a visit in 1 week  [address] [facility] [address] [address] 
[phone] Your Scheduled Appointments Monday July 23, 2018 10:00 AM EDT  
POST OP with Emma Pena MD  
[address] [facility] [address] [facility] [address] [address]  
[phone] Discharge Orders None A check lizeth indicates which time of day the medication should be taken. My Medications START taking these medications Instructions Each Dose to Equal  
 Morning Noon Evening Bedtime  
 oxyCODONE-acetaminophen 5-325 mg per tablet Commonly known as:  PERCOCET Your last dose was: Your next dose is: Take 1 Tab by mouth every four (4) hours as needed for Pain. Max Daily Amount: 6 Tabs. 1 Tab CONTINUE taking these medications Instructions Each Dose to Equal  
 Morning Noon Evening Bedtime  
 acetaminophen 325 mg tablet Commonly known as:  TYLENOL Your last dose was: Your next dose is:    
   
   
 325 mg.  
 325 mg  
    
   
   
   
  
 apixaban 5 mg tablet Commonly known as:  Porsha Costain Your last dose was: Your next dose is: Take 5 mg by mouth two (2) times a day. 5 mg aspirin delayed-release 81 mg tablet Your last dose was: Your next dose is: Take 81 mg by mouth daily. 81 mg  
    
   
   
   
  
 ATIVAN 1 mg tablet Generic drug:  LORazepam  
   
Your last dose was: Your next dose is: Take 1 mg by mouth three (3) times daily. 1 mg  
    
   
   
   
  
 cetirizine 10 mg tablet Commonly known as:  ZYRTEC Your last dose was: Your next dose is: Take 10 mg by mouth daily. 10 mg CYCLOSPORINE MODIFIED PO Your last dose was: Your next dose is: Take 50 mg by mouth two (2) times a day. 50 mg  
    
   
   
   
  
 furosemide 40 mg tablet Commonly known as:  LASIX Your last dose was: Your next dose is: Take 40 mg by mouth two (2) times a day. 40 mg  
    
   
   
   
  
 hydrOXYzine HCl 25 mg tablet Commonly known as:  ATARAX Your last dose was: Your next dose is: Take 25 mg by mouth every six (6) hours as needed. 25 mg  
    
   
   
   
  
 lisinopril 5 mg tablet Commonly known as:  Nonah Ksuum Your last dose was: Your next dose is: Take 5 mg by mouth two (2) times a day. 5 mg LYRICA 200 mg capsule Generic drug:  pregabalin Your last dose was: Your next dose is: Take 200 mg by mouth three (3) times daily. 200 mg MIRALAX 17 gram packet Generic drug:  polyethylene glycol Your last dose was: Your next dose is: Take 8.5 g by mouth daily as needed. 8.5 g  
    
   
   
   
  
 mycophenolate 500 mg tablet Commonly known as:  CELLCEPT Your last dose was: Your next dose is: Take 250 mg by mouth two (2) times a day. 250 mg  
    
   
   
   
  
 pravastatin 20 mg tablet Commonly known as:  PRAVACHOL  
   
 Your last dose was: Your next dose is: Take 40 mg by mouth nightly. 40 mg  
    
   
   
   
  
 * TRILEPTAL 150 mg tablet Generic drug:  OXcarbazepine Your last dose was: Your next dose is: Take  by mouth every morning. * TRILEPTAL 300 mg tablet Generic drug:  OXcarbazepine Your last dose was: Your next dose is: Take 450 mg by mouth nightly. 450 mg WELLBUTRIN  mg SR tablet Generic drug:  buPROPion SR Your last dose was: Your next dose is: Take 200 mg by mouth two (2) times a day. Indications: DEPRESSION  
 200 mg * Notice: This list has 2 medication(s) that are the same as other medications prescribed for you. Read the directions carefully, and ask your doctor or other care provider to review them with you. Where to Get Your Medications Information on where to get these meds will be given to you by the nurse or doctor. ! Ask your nurse or doctor about these medications  
  oxyCODONE-acetaminophen 5-325 mg per tablet Opioid Education Prescription Opioids: What You Need to Know: 
 
Prescription opioids can be used to help relieve moderate-to-severe pain and are often prescribed following a surgery or injury, or for certain health conditions. These medications can be an important part of treatment but also come with serious risks. Opioids are strong pain medicines. Examples include hydrocodone, oxycodone, fentanyl, and morphine. Heroin is an example of an illegal opioid. It is important to work with your health care provider to make sure you are getting the safest, most effective care. WHAT ARE THE RISKS AND SIDE EFFECTS OF OPIOID USE? Prescription opioids carry serious risks of addiction and overdose, especially with prolonged use.   An opioid overdose, often marked by slow breathing, can cause sudden death. The use of prescription opioids can have a number of side effects as well, even when taken as directed. · Tolerance-meaning you might need to take more of a medication for the same pain relief · Physical dependence-meaning you have symptoms of withdrawal when the medication is stopped. Withdrawal symptoms can include nausea, sweating, chills, diarrhea, stomach cramps, and muscle aches. Withdrawal can last up to several weeks, depending on which drug you took and how long you took it. · Increased sensitivity to pain · Constipation · Nausea, vomiting, and dry mouth · Sleepiness and dizziness · Confusion · Depression · Low levels of testosterone that can result in lower sex drive, energy, and strength · Itching and sweating RISKS ARE GREATER WITH:      
· History of drug misuse, substance use disorder, or overdose · Mental health conditions (such as depression or anxiety) · Sleep apnea · Older age (72 years or older) · Pregnancy Avoid alcohol while taking prescription opioids. Also, unless specifically advised by your health care provider, medications to avoid include: · Benzodiazepines (such as Xanax or Valium) · Muscle relaxants (such as Soma or Flexeril) · Hypnotics (such as Ambien or Lunesta) · Other prescription opioids KNOW YOUR OPTIONS Talk to your health care provider about ways to manage your pain that don't involve prescription opioids. Some of these options may actually work better and have fewer risks and side effects. Options may include: 
· Pain relievers such as acetaminophen, ibuprofen, and naproxen · Some medications that are also used for depression or seizures · Physical therapy and exercise · Counseling to help patients learn how to cope better with triggers of pain and stress. · Application of heat or cold compress · Massage therapy · Relaxation techniques Be Informed Make sure you know the name of your medication, how much and how often to take it, and its potential risks & side effects. IF YOU ARE PRESCRIBED OPIOIDS FOR PAIN: 
· Never take opioids in greater amounts or more often than prescribed. Remember the goal is not to be pain-free but to manage your pain at a tolerable level. · Follow up with your primary care provider to: · Work together to create a plan on how to manage your pain. · Talk about ways to help manage your pain that don't involve prescription opioids. · Talk about any and all concerns and side effects. · Help prevent misuse and abuse. · Never sell or share prescription opioids · Help prevent misuse and abuse. · Store prescription opioids in a secure place and out of reach of others (this may include visitors, children, friends, and family). · Safely dispose of unused/unwanted prescription opioids: Find your community drug take-back program or your pharmacy mail-back program, or flush them down the toilet, following guidance from the Food and Drug Administration (www.fda.gov/Drugs/ResourcesForYou). · Visit www.cdc.gov/drugoverdose to learn about the risks of opioid abuse and overdose. · If you believe you may be struggling with addiction, tell your health care provider and ask for guidance or call [address] [place] [address] at 1-779-503-UHYD. Discharge Instructions [facility] [address] [place] [address], [address] [place] [address] Anal and Rectal Surgery Discharge Instructions These instructions will cover the most common concerns following surgery on the anal area. If you have further questions or problems, please contact our office. The office is open Monday - Friday 8:30 AM to 4:30 pm. If emergencies arise after business hours, the answering service can contact the on-call physician.  The number for the office and answering service is [phone]. Recovery from Anesthesia/Sedation · Do not engage in any activity that requires physical/mental coordination, as the medications may cause drowsiness and dizziness. · Do not drive or operate heavy machinery for 24 hours. · Do not consume alcohol, tranquilizers or sleeping medications for 24 hours. · You must have someone home with you today. Activity · You are advised to go directly home. Restrict your activities and rest for a day. · Resume light to normal activity tomorrow unless instructed differently. · Try to avoid sitting for prolonged periods with pressure on the surgical site. Reclining or sitting to one side is better. It may take several weeks to return to normal activity. Fluids and Diet · Begin with a light diet (soup, toast, crackers). · Unless instructed differently, you may advance to regular food. · Avoid heavy, greasy and spicy foods. · Drink plenty of fluids daily. Follow-Up Unless you already have an appointment, call the office ([phone]) when you get home to make an appointment to see your surgeon approximately 2 weeks after discharge from the hospital. Also call the office for: · Fever greater than 101.5 F 
· Inability to urinate for more than 8 hours · Warmth, redness, or foul-smelling drainage from around the incision · Sudden loss of bright red blood from surgical site or rectum (greater than 1/2 cup). Pain Discomfort is common after surgery in this area. Soaking in a Sitz bath or tub of warn water may help with pain. · A narcotic pain medication has been prescribed by your surgeon. Take as directed. · Use your over-the-counter pain medication such as Ibuprofen when you have finished the prescription provided by your surgeon or if you feel that the pain can be managed with these medications. Bowel Regimen Many people find it helps to take fiber supplementation and a stool softener to regulate the bowels after surgery, especially if narcotic pain medication is being used. Colace or Miralax are options. You can take Milk of Magnesia one to two tablespoons every 4-6 hours as needed for more severe constipation. Wound Care It is common to have some clear or light yellow or pink-tinged drainage from the incision. Thick, foul-smelling drainage can be a sign of infection. Call the office if this occurs. It is also common to have small amounts of bleeding with bowel movements. Keep the surgical area clean by washing in the shower, tub or sitz bath after bowel movements. Gauze pads or a menstrual pad cab be used to protect clothing from drainage. It is fine to wash the wound in the shower. Avoid putting soap directly into the incision. · Remove packing and/or dressing  from incision in 24 hours. · It is important to take tub or sitz baths at least 4 times daily soaking at least 15 minutes each time. Try to take these baths especially after bowel movements. · Keep wound or incision covered with antibiotic ointment and dry gauze after each baths. Please contact our office for any concerns or questions. Suzanne Hutchison MD, FACS, FASCRS Colon and Rectal Surgery [facility] Office [phone] Fax     [phone] These instructions have been reviewed with me. I understand the above instructions and have no further questions. Responsible Party Signature: ______________________________________ Date: July 13, 2018 Nurse's Signature: ______________________________________ Date: July 13, 2018 DISCHARGE SUMMARY from Nurse PATIENT INSTRUCTIONS: 
 
After general anesthesia or intravenous sedation, for 24 hours or while taking prescription Narcotics: · Limit your activities · Do not drive and operate hazardous machinery · Do not make important personal or business decisions · Do  not drink alcoholic beverages · If you have not urinated within 8 hours after discharge, please contact your surgeon on call. Report the following to your surgeon: 
· Excessive pain, swelling, redness or odor of or around the surgical area · Temperature over 100.5 · Nausea and vomiting lasting longer than 4 hours or if unable to take medications · Any signs of decreased circulation or nerve impairment to extremity: change in color, persistent  numbness, tingling, coldness or increase pain · Any questions What to do at Home: 
Recommended activity: Activity as tolerated and no driving for today, These are general instructions for a healthy lifestyle: No smoking/ No tobacco products/ Avoid exposure to second hand smoke Surgeon General's Warning:  Quitting smoking now greatly reduces serious risk to your health. Obesity, smoking, and sedentary lifestyle greatly increases your risk for illness A healthy diet, regular physical exercise & weight monitoring are important for maintaining a healthy lifestyle You may be retaining fluid if you have a history of heart failure or if you experience any of the following symptoms:  Weight gain of 3 pounds or more overnight or 5 pounds in a week, increased swelling in our hands or feet or shortness of breath while lying flat in bed. Please call your doctor as soon as you notice any of these symptoms; do not wait until your next office visit. Recognize signs and symptoms of STROKE: 
 
F-face looks uneven A-arms unable to move or move unevenly S-speech slurred or non-existent T-time-call 911 as soon as signs and symptoms begin-DO NOT go Back to bed or wait to see if you get better-TIME IS BRAIN. Warning Signs of HEART ATTACK Call 911 if you have these symptoms: 
? Chest discomfort.  Most heart attacks involve discomfort in the center of the chest that lasts more than a few minutes, or that goes away and comes back. It can feel like uncomfortable pressure, squeezing, fullness, or pain. ? Discomfort in other areas of the upper body. Symptoms can include pain or discomfort in one or both arms, the back, neck, jaw, or stomach. ? Shortness of breath with or without chest discomfort. ? Other signs may include breaking out in a cold sweat, nausea, or lightheadedness. Don't wait more than five minutes to call [address]! Fast action can save your life. Calling 911 is almost always the fastest way to get lifesaving treatment. Emergency Medical Services staff can begin treatment when they arrive  up to an hour sooner than if someone gets to the hospital by car. The discharge information has been reviewed with the patient. The patient verbalized understanding. Discharge medications reviewed with the patient and appropriate educational materials and side effects teaching were provided. Patient armband removed and given to patient to take home. Patient was informed of the privacy risks if armband lost or stolen 
 
___________________________________________________________________________________________________________________________________ Introducing [facility] SERVICES! Kristin Jain introduces [employer] patient portal. Now you can access parts of your medical record, email your doctor's office, and request medication refills online. 1. In your internet browser, go to [email or web address] 2. Click on the First Time User? Click Here link in the Sign In box. You will see the New Member Sign Up page. 3. Enter your [employer] Access Code exactly as it appears below. You will not need to use this code after youve completed the sign-up process. If you do not sign up before the expiration date, you must request a new code. · [employer] Access Code: [email or web address] Expires: 8/19/2018  5:49 PM 
 
4.  Enter the last four digits of your Social Security Number (xxxx) and Date of Birth (mm/dd/yyyy) as indicated and click Submit. You will be taken to the next sign-up page. 5. Create a Relationship [employer] ID. This will be your [employer] login ID and cannot be changed, so think of one that is secure and easy to remember. 6. Create a [employer] password. You can change your password at any time. 7. Enter your Password Reset Question and Answer. This can be used at a later time if you forget your password. 8. Enter your e-mail address. You will receive e-mail notification when new information is available in [address] 9. Click Sign Up. You can now view and download portions of your medical record. 10. Click the Download Summary menu link to download a portable copy of your medical information. If you have questions, please visit the Frequently Asked Questions section of the [employer] website. Remember, [employer] is NOT to be used for urgent needs. For medical emergencies, dial 911. Now available from your iPhone and Android! Introducing Donavon Dumas As a [facility] patient, I wanted to make you aware of our electronic visit tool called Donavon Carvalhofin. [facility] 24/7 allows you to connect within minutes with a medical provider 24 hours a day, seven days a week via a mobile device or tablet or logging into a secure website from your computer. You can access Donavon Dumas from anywhere in the [place]. A virtual visit might be right for you when you have a simple condition and feel like you just dont want to get out of bed, or cant get away from work for an appointment, when your regular [facility] provider is not available (evenings, weekends or holidays), or when youre out of town and need minor care. Electronic visits cost only $49 and if the [facility] [facility] 24/7 provider determines a prescription is needed to treat your condition, one can be electronically transmitted to a nearby pharmacy*. Please take a moment to enroll today if you have not already done so. The enrollment process is free and takes just a few minutes. To enroll, please download the [employer] 24/7 aretha to your tablet or phone, or visit [email or web address] to enroll on your computer. And, as an [address] [place] [address] patient with a [employer] account, the results of your visits will be scanned into your electronic medical record and your primary care provider will be able to view the scanned results. We urge you to continue to see your regular [employer] provider for your ongoing medical care. And while your primary care provider may not be the one available when you seek a "[employer]" virtual visit, the peace of mind you get from getting a real diagnosis real time can be priceless. For more information on "[employer]", view our Frequently Asked Questions (FAQs) at [email or web address]. Sincerely, 
 
Taco Darling MD 
Chief Medical Officer [facility] Ashley Malvae *:  certain medications cannot be prescribed via "[employer]" Providers Seen During Your Hospitalization Provider Specialty Primary office phone Cherelle Foster MD Colon and Rectal Surgery [phone] Your Primary Care Physician (PCP) Primary Care Physician Office Phone Office Fax Leticiaalberkenney Meng [phone] You are allergic to the following Allergen Reactions Latex, Natural Rubber Rash Dobutamine Anaphylaxis Imuran (Azathioprine) Other (comments) Bad liver trouble Other Medication Hives  
 electrodes Copper Rash  
    
 Nsaids (Non-Steroidal Anti-Inflammatory Drug) Other (comments) Contrary to immunosuppressants Avelox (Moxifloxacin) Other (comments)  
 unknown Milrinone Other (comments) Migraine headache Other Plant, Animal, Environmental Rash Allergic to band-aids, detergent, deodorant, bath soap Tape (Adhesive) Hives Rash Contact Dermatitis Other (comments) Recent Documentation Height Weight BMI OB Status Smoking Status 1.676 m 86.9 kg 30.91 kg/m2 Postmenopausal Former Smoker Emergency Contacts Name Discharge Info Relation Home Work Mobile Dot Chahal DISCHARGE CAREGIVER [3] Mother [14]   [phone] Sydnie Hassan DISCHARGE CAREGIVER [3] Daughter [21] [phone] Lizabeth Tate  Child [2] [phone] Patient Belongings The following personal items are in your possession at time of discharge: 
  Dental Appliances: None  Visual Aid: Glasses      Home Medications: None   Jewelry: None  Clothing: Pants, Shirt, Footwear, Socks    Other Valuables: Cell Phone (Will give all belongings to mom) Please provide this summary of care documentation to your next provider. Signatures-by signing, you are acknowledging that this After Visit Summary has been reviewed with you and you have received a copy. Patient Signature:  ____________________________________________________________ Date:  ____________________________________________________________  
  
[facility] Provider Signature:  ____________________________________________________________ Date:  ____________________________________________________________

## 2018-07-13 NOTE — DISCHARGE INSTRUCTIONS
[facility]  [address] [place] [address], [address] [place] [address]            Anal and Rectal Surgery Discharge Instructions    These instructions will cover the most common concerns following surgery on the anal area. If you have further questions or problems, please contact our office. The office is open Monday - Friday 8:30 AM to 4:30 pm. If emergencies arise after business hours, the answering service can contact the on-call physician. The number for the office and answering service is [phone]. Recovery from Anesthesia/Sedation  · Do not engage in any activity that requires physical/mental coordination, as the medications may cause drowsiness and dizziness. · Do not drive or operate heavy machinery for 24 hours. · Do not consume alcohol, tranquilizers or sleeping medications for 24 hours. · You must have someone home with you today. Activity  · You are advised to go directly home. Restrict your activities and rest for a day. · Resume light to normal activity tomorrow unless instructed differently. · Try to avoid sitting for prolonged periods with pressure on the surgical site. Reclining or sitting to one side is better. It may take several weeks to return to normal activity. Fluids and Diet  · Begin with a light diet (soup, toast, crackers). · Unless instructed differently, you may advance to regular food. · Avoid heavy, greasy and spicy foods. · Drink plenty of fluids daily. Follow-Up  Unless you already have an appointment, call the office ([phone]) when you get home to make an appointment to see your surgeon approximately 2 weeks after discharge from the hospital. Also call the office for:  · Fever greater than 101.5 F  · Inability to urinate for more than 8 hours  · Warmth, redness, or foul-smelling drainage from around the incision  · Sudden loss of bright red blood from surgical site or rectum (greater than 1/2 cup).     Pain  Discomfort is common after surgery in this area. Soaking in a Sitz bath or tub of warn water may help with pain. · A narcotic pain medication has been prescribed by your surgeon. Take as directed. · Use your over-the-counter pain medication such as Ibuprofen when you have finished the prescription provided by your surgeon or if you feel that the pain can be managed with these medications. Bowel Regimen  Many people find it helps to take fiber supplementation and a stool softener to regulate the bowels after surgery, especially if narcotic pain medication is being used. Colace or Miralax are options. You can take Milk of Magnesia one to two tablespoons every 4-6 hours as needed for more severe constipation. Wound Care  It is common to have some clear or light yellow or pink-tinged drainage from the incision. Thick, foul-smelling drainage can be a sign of infection. Call the office if this occurs. It is also common to have small amounts of bleeding with bowel movements. Keep the surgical area clean by washing in the shower, tub or sitz bath after bowel movements. Gauze pads or a menstrual pad cab be used to protect clothing from drainage. It is fine to wash the wound in the shower. Avoid putting soap directly into the incision. · Remove packing and/or dressing  from incision in 24 hours. · It is important to take tub or sitz baths at least 4 times daily soaking at least 15 minutes each time. Try to take these baths especially after bowel movements. · Keep wound or incision covered with antibiotic ointment and dry gauze after each baths. Please contact our office for any concerns or questions. Pio Rader MD, FACS, FASCRS  Colon and Rectal Surgery  [facility]  Office [phone]  Fax     [phone]      These instructions have been reviewed with me. I understand the above instructions and have no further questions.     Responsible Party Signature: ______________________________________    Date: July 13, 2018    Nurse's Signature: ______________________________________    Date: July 13, 2018      DISCHARGE SUMMARY from Nurse    PATIENT INSTRUCTIONS:    After general anesthesia or intravenous sedation, for 24 hours or while taking prescription Narcotics:  · Limit your activities  · Do not drive and operate hazardous machinery  · Do not make important personal or business decisions  · Do  not drink alcoholic beverages  · If you have not urinated within 8 hours after discharge, please contact your surgeon on call. Report the following to your surgeon:  · Excessive pain, swelling, redness or odor of or around the surgical area  · Temperature over 100.5  · Nausea and vomiting lasting longer than 4 hours or if unable to take medications  · Any signs of decreased circulation or nerve impairment to extremity: change in color, persistent  numbness, tingling, coldness or increase pain  · Any questions    What to do at Home:  Recommended activity: Activity as tolerated and no driving for today,       These are general instructions for a healthy lifestyle:    No smoking/ No tobacco products/ Avoid exposure to second hand smoke  Surgeon General's Warning:  Quitting smoking now greatly reduces serious risk to your health. Obesity, smoking, and sedentary lifestyle greatly increases your risk for illness    A healthy diet, regular physical exercise & weight monitoring are important for maintaining a healthy lifestyle    You may be retaining fluid if you have a history of heart failure or if you experience any of the following symptoms:  Weight gain of 3 pounds or more overnight or 5 pounds in a week, increased swelling in our hands or feet or shortness of breath while lying flat in bed. Please call your doctor as soon as you notice any of these symptoms; do not wait until your next office visit.     Recognize signs and symptoms of STROKE:    F-face looks uneven    A-arms unable to move or move unevenly    S-speech slurred or non-existent    T-time-call 911 as soon as signs and symptoms begin-DO NOT go       Back to bed or wait to see if you get better-TIME IS BRAIN. Warning Signs of HEART ATTACK     Call 911 if you have these symptoms:   Chest discomfort. Most heart attacks involve discomfort in the center of the chest that lasts more than a few minutes, or that goes away and comes back. It can feel like uncomfortable pressure, squeezing, fullness, or pain.  Discomfort in other areas of the upper body. Symptoms can include pain or discomfort in one or both arms, the back, neck, jaw, or stomach.  Shortness of breath with or without chest discomfort.  Other signs may include breaking out in a cold sweat, nausea, or lightheadedness. Don't wait more than five minutes to call 911 - MINUTES MATTER! Fast action can save your life. Calling 911 is almost always the fastest way to get lifesaving treatment. Emergency Medical Services staff can begin treatment when they arrive -- up to an hour sooner than if someone gets to the hospital by car. The discharge information has been reviewed with the patient. The patient verbalized understanding. Discharge medications reviewed with the patient and appropriate educational materials and side effects teaching were provided. Patient armband removed and given to patient to take home.   Patient was informed of the privacy risks if armband lost or stolen    ___________________________________________________________________________________________________________________________________

## 2018-07-13 NOTE — BRIEF OP NOTE
BRIEF OPERATIVE NOTE    Date of Procedure: 7/13/2018   Preoperative Diagnosis: internal and external prolapsed hemorrhoids  k64.8  Postoperative Diagnosis: internal and external prolapsed hemorrhoids  k64.8    Procedure(s):  EXCISION OF ANAL MASS WITH HEMORRHOIDECTOMY   Surgeon(s) and Role:     * Misael Reyes MD - Primary           Surgical Staff:  Circ-1: Graciela Mccormick RN  Circ-2: Tin Diaz  Scrub Tech-1: Krista Large  Surg Asst-1: Roselyn Camp  Event Time In   Incision Start 1133   Incision Close [phone] [id number]     Anesthesia: MAC   Estimated Blood Loss: 5 mL  Specimens:   ID Type Source Tests Collected by Time Destination   1 : Left anal mass with hemorrhoid Preservative Anus  Misael Reyes MD 7/13/2018 1156 Pathology      Findings: Large anal mass with associated hemorrhoid disease   Complications: None  Implants: * No implants in log *

## 2018-07-16 LAB
BUN BLD-MCNC: 72 MG/DL (ref 7–18)
CHLORIDE BLD-SCNC: 105 MMOL/L (ref 100–108)
GLUCOSE BLD STRIP.AUTO-MCNC: 106 MG/DL (ref 74–106)
HCT VFR BLD CALC: 29 % (ref 36–49)
HGB BLD-MCNC: 9.9 G/DL (ref 12–16)
POTASSIUM BLD-SCNC: 5.2 MMOL/L (ref 3.5–5.5)
SODIUM BLD-SCNC: 140 MMOL/L (ref 136–145)

## 2018-07-17 ENCOUNTER — TELEPHONE (OUTPATIENT)
Dept: SURGERY | Age: 58
End: 2018-07-17

## 2018-07-23 ENCOUNTER — OFFICE VISIT (OUTPATIENT)
Dept: SURGERY | Age: 58
End: 2018-07-23

## 2018-07-23 ENCOUNTER — DOCUMENTATION ONLY (OUTPATIENT)
Dept: SURGERY | Age: 58
End: 2018-07-23

## 2018-07-23 VITALS
HEIGHT: 66 IN | BODY MASS INDEX: 30.37 KG/M2 | SYSTOLIC BLOOD PRESSURE: 120 MMHG | OXYGEN SATURATION: 96 % | DIASTOLIC BLOOD PRESSURE: 74 MMHG | TEMPERATURE: 97 F | RESPIRATION RATE: 18 BRPM | HEART RATE: 55 BPM | WEIGHT: 189 LBS

## 2018-07-23 DIAGNOSIS — C21.0 SQUAMOUS CELL CARCINOMA OF ANUS (HCC): Primary | ICD-10-CM

## 2018-07-23 DIAGNOSIS — K64.8 INTERNAL AND EXTERNAL PROLAPSED HEMORRHOIDS: ICD-10-CM

## 2018-07-23 NOTE — PROGRESS NOTES
[facility]  [address] [place] [address], [address] [place] [address]              Patient: Art Ahumada  Admitted: (Not on file) MRN: [id number]     Age:  63 y.o.,      Sex: female    YOB: 1960       Subjective    Ms. Tootie Wilson is an 63 y.o. female who is here for the first post op visit. The patient was recently seen in clinic for a progressive anal pain and bleeding secondary to hemorrhoid disease. The patient was noted to have a large prolapsed hemorrhoid disease with superficial erosion present. The patient underwent the following surgery on 7/13/2018 for a large mass in the anal canal and perianal area with associated prolapsed hemorrhoid disease:     1. Examination under anesthesia. 2.  Excision of the large anal mass with en bloc external and internal hemorrhoidectomy. The intraoperative findings showed a very large 4.5 x 4 cm mass originating from the anal canal at the 9 o'clock position and protruding to the perianal region. There was an associated large external and internal hemorrhoid disease associated with this immediately adjacent to the mass. The mass was concerning for a neoplasm. The final Pathology report revealed: FINAL DIAGNOSIS:   ANAL MASS, LEFT ASPECT, RESECTION: SQUAMOUS CELL CARCINOMA. SPECIMEN INTEGRITY: TWO PIECES OF TUMOR-CONTAINING TISSUE SUBMITTED. SIZE: 4 CM. HISTOLOGIC GRADE: MODERATELY DIFFERENTIATED (G2). EXTENT OF THE TUMOR: THE TUMOR EXTENDS INTO THE SUBMUCOSA. DEEP MARGIN OF RESECTION: NOT INVOLVED  MUCOSAL MARGIN OF RESECTION: NOT INVOLVED. TREATMENT EFFECT: NOT KNOWN.   LYMPHOVASCULAR SPACE INVASION: IDENTIFIED. AJCC STAGE: pT2. She is doing fairly well at home and is currently without complaints except for moderate pain which is improving. Her bowels are becoming regular, and the patient denies fever.         Objective    Vitals:    07/23/18 0949   BP: 120/74   Pulse: (!) 55   Resp: 18 Temp: 97 °F (36.1 °C)   TempSrc: Oral   SpO2: 96%   Weight: 85.7 kg (189 lb)   Height: 5' 6\" (1.676 m)   PainSc:   3   PainLoc: Rectum       Physical Exam:  General: alert, cooperative, no distress, appears stated age  Anorectal:  With the patient in the prone position the anus appeared within normal limits with healing operative site without any infectious complications. Digital rectal examination revealed Normal sphincter tone and squeeze pressure. Palpation revealed No Masses. Assessment / Plan    Ms. Cortez is recovering well postoperatively. The patient was informed about the Pathology findings. I discussed at length about the treatment plan carefully. I informed her that this will be primary chemotherapy and radiation therapy for curative intent. She preferred to be treated at [facility]. I will refer her there for consultations. Otherwise, the patient will continue the local wound care until the operative site has completely healed. Return in 2 weeks for follow up.           Pastor Goodwin MD, FACS, FASCRS  Colon and Rectal Surgery  [facility]  Office [phone]  Fax     [phone]  7/23/2018  11:20 AM

## 2018-07-23 NOTE — MR AVS SNAPSHOT
[facility] 
 
 
 [address] [place] [address] [address] [phone] Patient: Art Ahumada MRN: [id number] OHT:[birth year]10 Visit Information Date & Time Provider Department Dept. Phone Encounter #  
 7/23/2018 10:00 AM Cherelle Foster MD [facility] [phone] [id number] Your Appointments 8/7/2018 10:30 AM  
Follow Up with Cherelle Foster MD  
[address] [facility]: 2 weeks follow up from 07-23-18 per Dr Jamie He  
 [address] [place] [address]  
  
   
 [address] [place] [address] [address] [facility] [address] [facility] 95 Upcoming Health Maintenance Date Due Hepatitis C Screening 1960 Pneumococcal 19-64 Highest Risk (1 of 3 - PCV13) 6/2/1980 DTaP/Tdap/Td series (1 - Tdap) 6/2/1982 BREAST CANCER SCRN MAMMOGRAM 6/2/2011 FOBT Q 1 YEAR AGE 50-75 6/2/2011 Influenza Age 5 to Adult 8/1/2018 PAP AKA CERVICAL CYTOLOGY 4/30/2021 Allergies as of 7/23/2018  Review Complete On: 7/23/2018 By: Brown Hanks LPN Severity Noted Reaction Type Reactions Latex, Natural Rubber Medium 12/11/2012   Intolerance Rash Dobutamine High 12/11/2012   Systemic Anaphylaxis Imuran [Azathioprine] High 12/11/2012   Systemic Other (comments) Bad liver trouble Other Medication High 12/11/2012   Systemic Hives  
 electrodes Copper Medium   Rash  
 Nsaids (Non-steroidal Anti-inflammatory Drug) Medium 12/11/2012   Intolerance Other (comments) Contrary to immunosuppressants Avelox [Moxifloxacin]  12/11/2012   Not Verified Other (comments)  
 unknown Milrinone  10/24/2014    Other (comments) Migraine headache Other Plant, Animal, Environmental  10/24/2014    Rash Allergic to band-aids, detergent, deodorant, bath soap Tape [Adhesive]  10/24/2014    Hives, Rash, Contact Dermatitis, Other (comments) Current Immunizations  Never Reviewed No immunizations on file. Not reviewed this visit Vitals BP Pulse Temp Resp Height(growth percentile) Weight(growth percentile) 120/74 (!) 55 97 °F (36.1 °C) (Oral) 18 5' 6\" (1.676 m) 189 lb (85.7 kg) SpO2 BMI OB Status Smoking Status 96% 30.51 kg/m2 Postmenopausal Former Smoker Vitals History BMI and BSA Data Body Mass Index Body Surface Area 30.51 kg/m 2 2 m 2 Your Updated Medication List  
  
   
This list is accurate as of 7/23/18 10:44 AM.  Always use your most recent med list.  
  
  
  
  
 acetaminophen 325 mg tablet Commonly known as:  TYLENOL  
325 mg.  
  
 apixaban 5 mg tablet Commonly known as:  Eliceo Villarreal Take 5 mg by mouth two (2) times a day. aspirin delayed-release 81 mg tablet Take 81 mg by mouth daily. ATIVAN 1 mg tablet Generic drug:  LORazepam  
Take 1 mg by mouth three (3) times daily. cetirizine 10 mg tablet Commonly known as:  ZYRTEC Take 10 mg by mouth daily. CYCLOSPORINE MODIFIED PO Take 50 mg by mouth two (2) times a day. furosemide 40 mg tablet Commonly known as:  LASIX Take 40 mg by mouth two (2) times a day.  
  
 hydrOXYzine HCl 25 mg tablet Commonly known as:  ATARAX Take 25 mg by mouth every six (6) hours as needed. lisinopril 5 mg tablet Commonly known as:  Erna Loss Take 5 mg by mouth two (2) times a day. LYRICA 200 mg capsule Generic drug:  pregabalin Take 200 mg by mouth three (3) times daily. MIRALAX 17 gram packet Generic drug:  polyethylene glycol Take 8.5 g by mouth daily as needed. mycophenolate 500 mg tablet Commonly known as:  CELLCEPT Take 250 mg by mouth two (2) times a day. oxyCODONE-acetaminophen 5-325 mg per tablet Commonly known as:  PERCOCET  
 Take 1 Tab by mouth every four (4) hours as needed for Pain. Max Daily Amount: 6 Tabs. pravastatin 20 mg tablet Commonly known as:  PRAVACHOL Take 40 mg by mouth nightly. * TRILEPTAL 150 mg tablet Generic drug:  OXcarbazepine Take  by mouth every morning. * TRILEPTAL 300 mg tablet Generic drug:  OXcarbazepine Take 450 mg by mouth nightly. WELLBUTRIN  mg SR tablet Generic drug:  buPROPion SR Take 200 mg by mouth two (2) times a day. Indications: DEPRESSION  
  
 * Notice: This list has 2 medication(s) that are the same as other medications prescribed for you. Read the directions carefully, and ask your doctor or other care provider to review them with you. Patient Instructions If you have any questions or concerns about today's appointment, the verbal and/or written instructions you were given for follow up care, please call our office at [phone]. [facility] [place] [phone] office [phone] Introducing [facility]! [facility] introduces [employer] Response patient portal. Now you can access parts of your medical record, email your doctor's office, and request medication refills online. 1. In your internet browser, go to [email or web address] [employer] Appt 2. Click on the First Time User? Click Here link in the Sign In box. You will see the New Member Sign Up page. 3. Enter your DNA Response Access Code exactly as it appears below. You will not need to use this code after youve completed the sign-up process. If you do not sign up before the expiration date, you must request a new code. · DNA Response Access Code: [email or web address] Expires: 8/19/2018  5:49 PM 
 
4. Enter the last four digits of your Social Security Number (xxxx) and Date of Birth (mm/dd/yyyy) as indicated and click Submit. You will be taken to the next sign-up page. 5. Create a Encap ID. This will be your Encap login ID and cannot be changed, so think of one that is secure and easy to remember. 6. Create a Encap password. You can change your password at any time. 7. Enter your Password Reset Question and Answer. This can be used at a later time if you forget your password. 8. Enter your e-mail address. You will receive e-mail notification when new information is available in [address] 9. Click Sign Up. You can now view and download portions of your medical record. 10. Click the Download Summary menu link to download a portable copy of your medical information. If you have questions, please visit the Frequently Asked Questions section of the Encap website. Remember, Encap is NOT to be used for urgent needs. For medical emergencies, dial 911. Now available from your iPhone and Android! Please provide this summary of care documentation to your next provider. Your primary care clinician is listed as Telly Longo. If you have any questions after today's visit, please call [phone].

## 2018-07-23 NOTE — PATIENT INSTRUCTIONS
If you have any questions or concerns about today's appointment, the verbal and/or written instructions you were given for follow up care, please call our office at [phone].     [facility] - [address]    [phone] office  [phone]

## 2018-07-23 NOTE — PROGRESS NOTES
1. Have you been to the ER, urgent care clinic since your last visit? Hospitalized since your last visit? No    2. Have you seen or consulted any other health care providers outside of the [facility] since your last visit? Include any pap smears or colon screening. No     Patient presents for first post op from an excision of an anal mass 7/13/18.

## 2018-07-25 ENCOUNTER — NURSE NAVIGATOR (OUTPATIENT)
Dept: OTHER | Age: 58
End: 2018-07-25

## 2018-07-25 NOTE — NURSE NAVIGATOR
Initial phone assessment for Giovanni Conner, newly diagnosed with anal cancer. Phone eeting with pt included pt and only. Patient was assessed for the following barriers:    Communication:       Yes    No  -Ability to read/write,understand [place]       [x]      []    -Ability to talk with family/children,friends about diagnosis     [x]      []    -Other:  Comments/Referrals/Services provided: She talks with her family about her diagnosis. Employment/Financial/Legal:     Yes    No  -Loss of employment/income         []      [x]    -Insurance coverage          [x]      []     -Needs help applying for Social Security/Disability/FMLA     []      [x]     -Help with Co-Pays for office visits         []      [x]    -Medication assistance/medical supplies or equipment     []      [x]    -Difficulty paying bills: utilities/housing       []      [x]    -Means to buy food                     [x]      []    -Legal issues           []      [x]    -Other:  Comments/Referrals/Services provided: She gets disability insurance. She has [facility]/[employer] insurances and is able to take care of her finances. Psychosocial        Yes    No  Housing:  -Homeless           []      [x]    -Extended care needs: long term care/home care/Hospice     []      [x]    -Other:  Comments/Referrals/Services provided: She lives in an apt. Transportation:       Yes    No  -Lack of vehicle or public transportation options      []      [x]    -Funds need for public transportation: bus/taxi      []      [x]    -Other:  Comments/Referrals/Services provided: She uses the bus or [address] services for her transportation needs. Support system:       Yes No  -Family members at home: spouse/significant other/children    []      [x]    -Has a support system         [x]      []    Other:  Comments/Referrals/Services provided:  She lives alone. Her daughter lives in [place] and her mom in [facility].  She declines services from [facility] today.  Spirituality:        Yes No  -Spiritual issues          []      [x]    -Cultural concerns          []      [x]    -Other:  -Comments/Referrals/Services provided: No concerns. She does not attend [employer]. Sexuality:        Yes No  -Body image concerns                     []      [x]    -Relationships/significant other issues        []      [x]    -Other:  Comments/Referrals/Services provided: No concerns today. Coping:        Yes    No  -Able to manage emotions         []      [x]    -Feeling fearful or anxious         [x]      []    -Interest in attending support groups        []      [x]    -Cancer related pain/control         [x]      []    -Other:  Comments/Referrals/Services provided: She is very stressed as this is her third cancer within a few years. She is taking medications for depression, anxiety from Dr Veda Joyner. Tobacco dependency:      Yes No  -Currently smokes: cigarettes/cigars        [x]      []    -Uses smokeless tobacco         []      [x]    -Other:  Comments/Referrals/Services provided: She smokes marijuana only. Education/review of Disease process and Management Yes No  -Explanation of navigator role/contact information      [x]      []    -New Patient Guide for Anal Cancer provided                 [x]      []    -Pathology/Staging          [x]      []    -Diagnostic tests          [x]      []    -Genetic testing needed         []      [x]    -Treatment options/plan: surgery/chemotherapy/radiation     [x]      []    -Mediport placement as needed                              []      [x]    -Tobacco cessation as needed        [x]      []    -Need for a second opinion         [x]      []    -Importance of bringing family/friends to medical appts     [x]      []   -Other:  Patient/family verbalized understanding of treatment plan: Yes. She has completed surgery and will consult with medical oncology and radiation oncology to discuss future treatment plans.   NCCN Distress Tool    Tommy Reed Dana  was assessed for management of distress using the NCCN Distress Thermometer for Patients tool. Mild to moderate distress  Scorin-4        Yes    No    -Practical/physical issues       [x]      []      -Provide community resources      [x]      []      -Provide list of support groups      [x]      []      -Provide list of national organizations and websites               [x]      []      -Provide ongoing supportive care by oncology medical team        [x]      []                  Comments/Referrals/Services provided:  Yes. Moderate to severe distress  Scorin or greater                   Yes    No    -Navigator consulted with MD      [x]      []     -Navigator follow up         [x]      []     -Spiritual concerns        []      [x]     -Emotional/family concerns       [x]      []     -Refer to /mental health professional/PCP   [x]      []      Comments/Referral/Services provided:  Score:10+. Dr Colon Person aware. She has h/o psychiatric issues and is being treated by Dr. Toni Argueta. She has appt with him to discuss her new cancer diagnosis. She will be reassessed in the future.         Patient Acuity  0    No navigation        Patient declined services or never returned call    1    Initial education/guidance only        No follow up needed    2    Moderate intensity of needs        Multimodality treatment        Ongoing education/guidance for 5-6 months        No barriers    3    High intensity of needs        Multimodality treatment        Hospitalizations associated with care        Ongoing education/guidance for 6-12 months        Barriers: 1-2    4     High intensity of needs         Multimodality treatment         Coordinated care outside of facility         Ongoing education/guidance for 6-12 months         Barriers: 3 or more    Acuity score: 3

## 2018-08-07 ENCOUNTER — NURSE NAVIGATOR (OUTPATIENT)
Dept: OTHER | Age: 58
End: 2018-08-07

## 2018-08-07 ENCOUNTER — OFFICE VISIT (OUTPATIENT)
Dept: SURGERY | Age: 58
End: 2018-08-07

## 2018-08-07 VITALS
BODY MASS INDEX: 30.53 KG/M2 | WEIGHT: 190 LBS | SYSTOLIC BLOOD PRESSURE: 120 MMHG | HEIGHT: 66 IN | RESPIRATION RATE: 20 BRPM | OXYGEN SATURATION: 92 % | TEMPERATURE: 98.2 F | DIASTOLIC BLOOD PRESSURE: 72 MMHG | HEART RATE: 65 BPM

## 2018-08-07 DIAGNOSIS — C21.0 SQUAMOUS CELL CARCINOMA OF ANUS (HCC): Primary | ICD-10-CM

## 2018-08-07 NOTE — PROGRESS NOTES
1. Have you been to the ER, urgent care clinic since your last visit? Hospitalized since your last visit? No    2. Have you seen or consulted any other health care providers outside of the [address] [place] [address] since your last visit? Include any pap smears or colon screening. Yes MRI at [facility]     Patient presents for second post op from excision of anal mass 7/13/18.

## 2018-08-22 ENCOUNTER — OFFICE VISIT (OUTPATIENT)
Dept: SURGERY | Age: 58
End: 2018-08-22

## 2018-08-22 ENCOUNTER — NURSE NAVIGATOR (OUTPATIENT)
Dept: OTHER | Age: 58
End: 2018-08-22

## 2018-08-22 VITALS
HEIGHT: 66 IN | OXYGEN SATURATION: 95 % | HEART RATE: 46 BPM | RESPIRATION RATE: 18 BRPM | BODY MASS INDEX: 30.22 KG/M2 | SYSTOLIC BLOOD PRESSURE: 112 MMHG | DIASTOLIC BLOOD PRESSURE: 65 MMHG | TEMPERATURE: 97.1 F | WEIGHT: 188 LBS

## 2018-08-22 DIAGNOSIS — C21.0 SQUAMOUS CELL CARCINOMA OF ANUS (HCC): Primary | ICD-10-CM

## 2018-08-22 NOTE — PATIENT INSTRUCTIONS
If you have any questions or concerns about today's appointment, the verbal and/or written instructions you were given for follow up care, please call our office at [phone]. [facility] - [address]    [phone] office  [phone]      . Myra Vogt [facility] - [address]    [phone] office main line  [phone] main fax                  PATIENT PRE AND POST [address]: [facility] [address]  [phone]    Before Surgery Instructions:   1) You must have someone available to drive you to and from your procedure and stay with you for the first 24 hours. 2) It is very important that you have nothing to eat or drink after midnight the night before your surgery. This includes chewing gum or sucking on hard candy. Take only heart, blood pressure and cholesterol medications the morning of surgery with only a sip of water. 3) Please stop taking Plavix 10 days prior to your surgery. Stop taking Coumadin 5 days prior to your surgery. Stop taking all Aspirin or Aspirin containing products 7 days prior to your surgery. Stop taking Advil, Motrin, Aleve, and etc. 3 days prior to your surgery. 4) If you take any diabetic medications please consult with your primary care physician on how to take them on the day of your surgery  5) Please stop all Herbal products 2 weeks prior to your surgery. 6) Please arrive at the hospital 2 hours prior to your surgery, unless you have been otherwise instructed. 7) Patients having an operation on their colon will be given a separate instruction sheet on their Bowel Prep. 8) For any pre-operative work up check in at the main entrance to [facility], and then go to Patient Registration.  These studies are done on a walk in basis they are open from 7:00am to 5:00pm Monday through Friday. 9) Please shower the morning of your surgery using an antibacterial soap (DIAL). 10) If you are of child bearing age you will have pregnancy test done the morning of your surgery as soon as you arrive. 11) You may be contacted to change your surgery time. At times this is necessary due to equipment or staffing needs. Surgery Date and Time:      Sept. 13, 2018           at    8:30      am          Please check in at [facility], enter through the Emergency Room entrance and go up to the second floor. Please check in by   6:30      am  the day of your surgery. You may contact Carmencita Sloan with any questions at [phone]. After Surgery Instructions: You will need to be seen in the office for a follow-up visit 7-14 days after your surgery. Please call after you have had the procedure to make this appointment. Unless otherwise instructed, you may remove your outer bandage and shower 48 hours after your surgery. If you develop a fever greater than 101, have any significant drainage, bleeding, swelling and/or pus of the wound. Please call our office immediately.

## 2018-08-22 NOTE — PROGRESS NOTES
1. Have you been to the ER, urgent care clinic since your last visit? Hospitalized since your last visit? No    2. Have you seen or consulted any other health care providers outside of the [address] [place] [address] since your last visit? Include any pap smears or colon screening. Yes Dr. Yudi Bernal     Patient presents for post op from excision of anal mass and to discuss further surgery.

## 2018-08-22 NOTE — PROGRESS NOTES
[facility]  [address] [place] [address], [address] [place] [address]            Colon and Rectal Surgery History and Physical    Patient: Carmen Jamison  MRN: [id number]  SSN: [id number]   YOB: 1960  Age: 63 y.o. Sex: female       Ms. Karlene Martinez is an 63 y.o. female who is here for a follow up visit and to discuss her surgical options.     Ms. Jasmyn Parmarlers in clinic for a progressive anal pain and bleeding secondary to hemorrhoid disease.  The patient was noted to have a large prolapsed hemorrhoid disease with superficial erosion present.  The patient underwent the following surgery on 7/13/2018 for a large mass in the anal canal and perianal area with associated prolapsed hemorrhoid disease:                            9.  Examination under anesthesia.                       4.  Excision of the large anal mass with en bloc external and internal hemorrhoidectomy.      The intraoperative findings showed a very large 4.5 x 4 cm mass originating from the anal canal at the 9 o'clock position and protruding to the perianal region. Myrla Misty was an associated large external and internal hemorrhoid disease associated with this immediately adjacent to the mass.  The mass was concerning for a neoplasm.        The final Pathology report revealed:   FINAL DIAGNOSIS:   ANAL MASS, LEFT ASPECT, RESECTION: SQUAMOUS CELL CARCINOMA. SPECIMEN INTEGRITY: TWO PIECES OF TUMOR-CONTAINING TISSUE SUBMITTED. SIZE: 4 CM. HISTOLOGIC GRADE: MODERATELY DIFFERENTIATED (G2). EXTENT OF THE TUMOR: THE TUMOR EXTENDS INTO THE SUBMUCOSA. DEEP MARGIN OF RESECTION: NOT INVOLVED  MUCOSAL MARGIN OF RESECTION: NOT INVOLVED. TREATMENT EFFECT: NOT KNOWN.   LYMPHOVASCULAR SPACE INVASION: IDENTIFIED. AJCC STAGE: pT2.       I also discussed the case with Dr. Maryellen Ellis (Radiation Oncology at [facility]) who treated the patient for the vaginal carcinoma previously.   He informs me that the patient cannot undergo any further radiation therapy since she received a full dosage of radiation treating her vaginal carcinoma. Also he informs me that the PET/CT scan from [facility] indicates inguinal lymph node metastatic disease. Dr. Alea Hill will be able to administer radiation booster therapy for the groin disease only if the patient undergoes surgery for curative intent. Dr. Donna Rossi was also informed about the patient. She will be also be able to administer chemotherapy post op for adjuvant therapy.     I discussed this situation with the patient and her daughter Hugh Padilla by phone) extensively. Several options were discussed. The only option for any curative intent will be radical extirpative surgery, essentially abdominoperineal resection with permanent colostomy. The other options will only be palliative. The patient states that she has decided to proceed with the abdominoperineal resection and accept the permanent colostomy. She is doing fairly well at home with only mild to moderate anal pain. She states that the malignant mass at the previous surgical site has grown a bit.       Past Medical History:   Diagnosis Date    Arthritis     Avascular necrosis of femoral head, left ([address]     CAD (coronary artery disease)     Cancer (HCC)     Vaginal    GERD (gastroesophageal reflux disease)     Hypercholesteremia     Hypertension     Ill-defined condition     fractured foot, Bilateral    Ill-defined condition     wrist fracture    Psychiatric disorder     Depression, Anxiety, Bipolar    S/P radiation therapy     Status post chemotherapy      Past Surgical History:   Procedure Laterality Date    COLONOSCOPY N/A 5/23/2018    COL[facility] performed [facility] F[facility] at [facility]COPY    HX CYST REMOVAL  2006    HX GYN      Laser    HX HEART VALVE SURGERY  2013    Tricuspid valve    TRANSPLANTATION OF HEART  1999     Allergies   Allergen Reactions    Latex, Natural Rubber Rash  Dobutamine Anaphylaxis    Imuran [Azathioprine] Other (comments)     Bad liver trouble    Other Medication Hives     electrodes    Copper Rash    Nsaids (Non-Steroidal Anti-Inflammatory Drug) Other (comments)     Contrary to immunosuppressants    Avelox [Moxifloxacin] Other (comments)     unknown    Milrinone Other (comments)     Migraine headache      Other Plant, Animal, Environmental Rash     Allergic to band-aids, detergent, deodorant, bath soap    Tape [Adhesive] Hives, Rash, Contact Dermatitis and Other (comments)     Current Outpatient Prescriptions   Medication Sig Dispense Refill    oxyCODONE-acetaminophen (PERCOCET) 5-325 mg per tablet Take 1 Tab by mouth every four (4) hours as needed for Pain. Max Daily Amount: 6 Tabs. 20 Tab 0    furosemide (LASIX) 40 mg tablet Take 40 mg by mouth two (2) times a day.  CYCLOSPORINE MODIFIED PO Take 50 mg by mouth two (2) times a day.  acetaminophen (TYLENOL) 325 mg tablet 325 mg.      apixaban (ELIQUIS) 5 mg tablet Take 5 mg by mouth two (2) times a day.  cetirizine (ZYRTEC) 10 mg tablet Take 10 mg by mouth daily.  pravastatin (PRAVACHOL) 20 mg tablet Take 40 mg by mouth nightly.  lisinopril (PRINIVIL, ZESTRIL) 5 mg tablet Take 5 mg by mouth two (2) times a day.  hydrOXYzine HCl (ATARAX) 25 mg tablet Take 25 mg by mouth every six (6) hours as needed.  pregabalin (LYRICA) 200 mg capsule Take 200 mg by mouth three (3) times daily.  LORazepam (ATIVAN) 1 mg tablet Take 1 mg by mouth three (3) times daily.  OXcarbazepine (TRILEPTAL) 300 mg tablet Take 450 mg by mouth nightly.  aspirin delayed-release 81 mg tablet Take 81 mg by mouth daily.  mycophenolate (CELLCEPT) 500 mg tablet Take 250 mg by mouth two (2) times a day.  polyethylene glycol (MIRALAX) 17 gram packet Take 8.5 g by mouth daily as needed.  OXcarbazepine (TRILEPTAL) 150 mg tablet Take  by mouth every morning.       buPROPion SR [facility] SR) 200 mg SR tablet Take 200 mg by mouth two (2) times a day. Indications: DEPRESSION           Physical Examination    Vitals:    08/22/18 1421   BP: 112/65   Pulse: (!) 46   Resp: 18   Temp: 97.1 °F (36.2 °C)   TempSrc: Oral   SpO2: 95%   Weight: 85.3 kg (188 lb)   Height: 5' 6\" (1.676 m)        Physical Exam:   GENERAL: alert, cooperative, no distress, appears stated age  LUNG: clear to auscultation bilaterally  HEART: regular rate and rhythm  ABDOMEN: soft, non-tender. Bowel sounds normal. No masses,  no organomegaly  EXTREMITIES:  extremities normal, atraumatic, no cyanosis or edema    Anorectal: With the patient in the left lateral position the anus mass at the operative site consistent for recurrent carcinoma was a bit enlarged since there last exam.  This site was without any infectious complications. Digital rectal examination revealed normal sphincter tone and squeeze pressure.  Palpation revealed mildly tender anal mass extending into the anal canal,      Assessment and Plan:    The patient elects to proceed with abdominoperineal resection with permanent colostomy. Dr. Justine Ferris will proceed with radiation therapy to the bilateral groins for the metastatic inguinal lymph nodes once she has full recovered from the surgery. In addition, the patient will follow up with Dr. Pippa Erazo for consideration for chemotherapy following surgery. The patient understand that given her very significant co-morbidities, she is at a high risk for perioperative mortality/morbidity. She accepts this and also the permanent colostomy. I discussed the details of the procedure. The risks were discussed including bleeding, infection, anesthesia risks, wound complications, and possibility of perioperative death. The patient was willing to accept the risks and proceed with surgery. The patient will be seen in her transplant cardiology clinic and nephology clinic as scheduled in preparation for surgery. Magnolia Gordon MD, FACS, FASCRS  Colon and Rectal Surgery  [facility]  Office [phone]  Fax     [phone]  8/22/2018  3:28 PM     Claire

## 2018-08-22 NOTE — MR AVS SNAPSHOT
[facility] 
 
 
 [address] [place] [address] [address] 
[phone] Patient: Nish Ortiz MRN: [id number] Visit Information Date & Time Provider Department Dept. Phone Encounter #  
 8/22/2018  2:30 PM Edu Lester MD [facility] [phone] [id number] Your Appointments 9/24/2018 10:00 AM  
POST OP with Edu Lester MD  
[address] [facility] Appt Note: postop from 09-13-18 surgery [id number] [facility] [address]  
  
   
 [address] [facility] [address] [facility] [address] Upcoming Health Maintenance Date Due Hepatitis C Screening 1960 Pneumococcal 19-64 Highest Risk (1 of 3 - PCV13) 6/2/1980 DTaP/Tdap/Td series (1 - Tdap) 6/2/1982 BREAST CANCER SCRN MAMMOGRAM 6/2/2011 FOBT Q 1 YEAR AGE 50-75 6/2/2011 MEDICARE YEARLY EXAM 7/23/2018 Influenza Age 5 to Adult 8/1/2018 PAP AKA CERVICAL CYTOLOGY 4/30/2021 Allergies as of 8/22/2018  Review Complete On: 8/22/2018 By: Edu Lester MD  
  
 Severity Noted Reaction Type Reactions Latex, Natural Rubber Medium 12/11/2012   Intolerance Rash Dobutamine High 12/11/2012   Systemic Anaphylaxis Imuran [Azathioprine] High 12/11/2012   Systemic Other (comments) Bad liver trouble Other Medication High 12/11/2012   Systemic Hives  
 electrodes Copper Medium   Rash  
 Nsaids (Non-steroidal Anti-inflammatory Drug) Medium 12/11/2012   Intolerance Other (comments) Contrary to immunosuppressants Avelox [Moxifloxacin]  12/11/2012   Not Verified Other (comments)  
 unknown Milrinone  10/24/2014    Other (comments) Migraine headache Other Plant, Animal, Environmental  10/24/2014    Rash Allergic to band-aids, detergent, deodorant, bath soap Tape [Adhesive]  10/24/2014    Hives, Rash, Contact Dermatitis, Other (comments) Current Immunizations  Never Reviewed No immunizations on file. Not reviewed this visit You Were Diagnosed With   
  
 Codes Comments Squamous cell carcinoma of anus (HCC)    -  Primary ICD-10-CM: C21.0 ICD-9-CM: 154. 3 Vitals BP Pulse Temp Resp Height(growth percentile) Weight(growth percentile) 112/65 (!) 46 97.1 °F (36.2 °C) (Oral) 18 5' 6\" (1.676 m) 188 lb (85.3 kg) SpO2 BMI OB Status Smoking Status 95% 30.34 kg/m2 Postmenopausal Former Smoker BMI and BSA Data Body Mass Index Body Surface Area  
 30.34 kg/m 2 1.99 m 2 Your Updated Medication List  
  
   
This list is accurate as of 8/22/18  3:45 PM.  Always use your most recent med list.  
  
  
  
  
 acetaminophen 325 mg tablet Commonly known as:  TYLENOL  
325 mg.  
  
 apixaban 5 mg tablet Commonly known as:  Corsica Stade Take 5 mg by mouth two (2) times a day. aspirin delayed-release 81 mg tablet Take 81 mg by mouth daily. ATIVAN 1 mg tablet Generic drug:  LORazepam  
Take 1 mg by mouth three (3) times daily. cetirizine 10 mg tablet Commonly known as:  ZYRTEC Take 10 mg by mouth daily. CYCLOSPORINE MODIFIED PO Take 50 mg by mouth two (2) times a day. furosemide 40 mg tablet Commonly known as:  LASIX Take 40 mg by mouth two (2) times a day.  
  
 hydrOXYzine HCl 25 mg tablet Commonly known as:  ATARAX Take 25 mg by mouth every six (6) hours as needed. lisinopril 5 mg tablet Commonly known as:  Crain Bhargavi Take 5 mg by mouth two (2) times a day. LYRICA 200 mg capsule Generic drug:  pregabalin Take 200 mg by mouth three (3) times daily. MIRALAX 17 gram packet Generic drug:  polyethylene glycol Take 8.5 g by mouth daily as needed. mycophenolate 500 mg tablet Commonly known as:  CELLCEPT  
 Take 250 mg by mouth two (2) times a day. oxyCODONE-acetaminophen 5-325 mg per tablet Commonly known as:  PERCOCET Take 1 Tab by mouth every four (4) hours as needed for Pain. Max Daily Amount: 6 Tabs. pravastatin 20 mg tablet Commonly known as:  PRAVACHOL Take 40 mg by mouth nightly. * TRILEPTAL 150 mg tablet Generic drug:  OXcarbazepine Take  by mouth every morning. * TRILEPTAL 300 mg tablet Generic drug:  OXcarbazepine Take 450 mg by mouth nightly. WELLBUTRIN  mg SR tablet Generic drug:  buPROPion SR Take 200 mg by mouth two (2) times a day. Indications: DEPRESSION  
  
 * Notice: This list has 2 medication(s) that are the same as other medications prescribed for you. Read the directions carefully, and ask your doctor or other care provider to review them with you. Patient Instructions If you have any questions or concerns about today's appointment, the verbal and/or written instructions you were given for follow up care, please call our office at [phone]. [facility] [address], [address] 
 
[phone] office [phone] Kendra Higuera [employer] [facility]  [address], [address] 
 
[phone] office main line [phone] main fax PATIENT PRE AND POST OPERATIVE INSTRUCTIONS Hospital: [facility] [address] 
[phone] Before Surgery Instructions:  
1) You must have someone available to drive you to and from your procedure and stay with you for the first 24 hours. 2) It is very important that you have nothing to eat or drink after midnight the night before your surgery. This includes chewing gum or sucking on hard candy. Take only heart, blood pressure and cholesterol medications the morning of surgery with only a sip of water. 3) Please stop taking Plavix 10 days prior to your surgery. Stop taking Coumadin 5 days prior to your surgery. Stop taking all Aspirin or Aspirin containing products 7 days prior to your surgery. Stop taking Advil, Motrin, Aleve, and etc. 3 days prior to your surgery. 4) If you take any diabetic medications please consult with your primary care physician on how to take them on the day of your surgery 5) Please stop all Herbal products 2 weeks prior to your surgery. 6) Please arrive at the hospital 2 hours prior to your surgery, unless you have been otherwise instructed. 7) Patients having an operation on their colon will be given a separate instruction sheet on their Bowel Prep. 8) For any pre-operative work up check in at the main entrance to [address] [place] [address], and then go to Patient Registration. These studies are done on a walk in basis they are open from 7:00am to 5:00pm Monday through Friday. 9) Please shower the morning of your surgery using an antibacterial soap (DIAL). 10) If you are of child bearing age you will have pregnancy test done the morning of your surgery as soon as you arrive. 11) You may be contacted to change your surgery time. At times this is necessary due to equipment or staffing needs. Surgery Date and Time:      Sept. 13, 2018           at    8:30      am       
 
Please check in at [facility], enter through the Emergency Room entrance and go up to the second floor. Please check in by   6:30      am  the day of your surgery. You may contact [facility] with any questions at [phone]. After Surgery Instructions: You will need to be seen in the office for a follow-up visit 7-14 days after your surgery. Please call after you have had the procedure to make this appointment. Unless otherwise instructed, you may remove your outer bandage and shower 48 hours after your surgery. If you develop a fever greater than 101, have any significant drainage, bleeding, swelling and/or pus of the wound. Please call our office immediately. Introducing hospitals & HEALTH SERVICES! [employer] introduces [employer] patient portal. Now you can access parts of your medical record, email your doctor's office, and request medication refills online. 1. In your internet browser, go to [email or web address] 2. Click on the First Time User? Click Here link in the Sign In box. You will see the New Member Sign Up page. 3. Enter your [employer] Access Code exactly as it appears below. You will not need to use this code after youve completed the sign-up process. If you do not sign up before the expiration date, you must request a new code. · [employer] Access Code: [email or web address] Expires: 11/20/2018  3:45 PM 
 
4. Enter the last four digits of your Social Security Number (xxxx) and Date of Birth (mm/dd/yyyy) as indicated and click Submit. You will be taken to the next sign-up page. 5. Create a [employer] ID. This will be your [employer] login ID and cannot be changed, so think of one that is secure and easy to remember. 6. Create a [employer] password. You can change your password at any time. 7. Enter your Password Reset Question and Answer. This can be used at a later time if you forget your password. 8. Enter your e-mail address. You will receive e-mail notification when new information is available in [address] 9. Click Sign Up. You can now view and download portions of your medical record. 10. Click the Download Summary menu link to download a portable copy of your medical information. If you have questions, please visit the Frequently Asked Questions section of the [employer] website. Remember, [employer] is NOT to be used for urgent needs. For medical emergencies, dial 911. Now available from your iPhone and Android! Please provide this summary of care documentation to your next provider. Your primary care clinician is listed as Thomas Green. If you have any questions after today's visit, please call [phone].

## 2018-08-23 ENCOUNTER — TELEPHONE (OUTPATIENT)
Dept: OTHER | Age: 58
End: 2018-08-23

## 2018-08-23 NOTE — NURSE NAVIGATOR
Saw patient in clinic with Dr. Shantell Trejo to discuss additional surgery, multiple options presented to patient. The patient has chosen to move forward with the APR surgical procedure. Risk and benefits explained to patient, colorectal surgical education booklet reviewed and given to patient. Dr. Shantell Trejo will communicate with the patient's heart transplant physicians to discuss surgery, and a referral to nephrology initiated. NCCN Distress Tool    Ronna Fonseca is diagnosed with anal cancer and was assessed for management of distress using the NCCN Distress Thermometer for Patients tool. Mild to moderate distress  Scorin-4        Yes    No    -Practical/physical issues       []      [x]      -Provide community resources      []      [x]      -Provide list of support groups      []      [x]      -Provide list of national organizations and websites               []      [x]      -Provide ongoing supportive care by oncology medical team        [x]      []                  Comments/Referrals/Services provided:  Yes. Patient score was 8, Dr. Shantell Trejo notified, patient ask to contact PCP to manage stress    Moderate to severe distress  Scorin or greater                   Yes    No    -Navigator consulted with MD      [x]      []     -Navigator follow up         [x]      []     -Spiritual concerns        [x]      []     -Emotional/family concerns       []      [x]     -Refer to /mental health professional/PCP   [x]      []      Comments/Referral/Services provided:  Yes.  Will reassess patient on next visit

## 2018-08-24 ENCOUNTER — TELEPHONE (OUTPATIENT)
Dept: SURGERY | Age: 58
End: 2018-08-24

## 2018-08-24 ENCOUNTER — NURSE NAVIGATOR (OUTPATIENT)
Dept: OTHER | Age: 58
End: 2018-08-24

## 2018-08-24 NOTE — NURSE NAVIGATOR
Received a call from patient's daughter Keri Rocha who stated that she received a call to coordinate an appointment with nephrology in [facility]. The daughter is  requesting a nephrologist closer to home. Dr Aaron Wong  will schedule an appointment and call patient.

## 2018-08-24 NOTE — TELEPHONE ENCOUNTER
I talked to Ms. Refugio Mancia and informed her that I was able to look for a different Nephrology group ([facility] [address]), they are located near [facility]. I told her she will get a phone call from them with the appointment date and time. Patient was very appreciative and verbalized understanding.

## 2018-08-29 ENCOUNTER — APPOINTMENT (OUTPATIENT)
Dept: CT IMAGING | Age: 58
End: 2018-08-29
Attending: EMERGENCY MEDICINE
Payer: MEDICARE

## 2018-08-29 ENCOUNTER — HOSPITAL ENCOUNTER (EMERGENCY)
Age: 58
Discharge: HOME | End: 2018-08-29
Attending: EMERGENCY MEDICINE
Payer: MEDICARE

## 2018-08-29 ENCOUNTER — APPOINTMENT (OUTPATIENT)
Dept: GENERAL RADIOLOGY | Age: 58
End: 2018-08-29
Attending: EMERGENCY MEDICINE
Payer: MEDICARE

## 2018-08-29 VITALS
BODY MASS INDEX: 30.22 KG/M2 | HEART RATE: 41 BPM | TEMPERATURE: 98.8 F | WEIGHT: 188 LBS | SYSTOLIC BLOOD PRESSURE: 115 MMHG | RESPIRATION RATE: 17 BRPM | HEIGHT: 66 IN | OXYGEN SATURATION: 100 % | DIASTOLIC BLOOD PRESSURE: 58 MMHG

## 2018-08-29 DIAGNOSIS — D50.0 BLOOD LOSS ANEMIA: ICD-10-CM

## 2018-08-29 DIAGNOSIS — R00.1 SINUS BRADYCARDIA: ICD-10-CM

## 2018-08-29 DIAGNOSIS — C21.0 SQUAMOUS CELL CARCINOMA OF ANUS (HCC): Primary | ICD-10-CM

## 2018-08-29 LAB
ABO + RH BLD: NORMAL
ANION GAP SERPL CALC-SCNC: 7 MMOL/L (ref 3–18)
APTT PPP: 32.6 SEC (ref 23–36.4)
BASOPHILS # BLD: 0 K/UL (ref 0–0.1)
BASOPHILS NFR BLD: 1 % (ref 0–2)
BLOOD GROUP ANTIBODIES SERPL: NORMAL
BNP SERPL-MCNC: 2579 PG/ML (ref 0–900)
BUN SERPL-MCNC: 48 MG/DL (ref 7–18)
BUN/CREAT SERPL: 25 (ref 12–20)
CALCIUM SERPL-MCNC: 8.6 MG/DL (ref 8.5–10.1)
CHLORIDE SERPL-SCNC: 115 MMOL/L (ref 100–108)
CO2 SERPL-SCNC: 21 MMOL/L (ref 21–32)
CREAT SERPL-MCNC: 1.93 MG/DL (ref 0.6–1.3)
DIFFERENTIAL METHOD BLD: ABNORMAL
EOSINOPHIL # BLD: 0.1 K/UL (ref 0–0.4)
EOSINOPHIL NFR BLD: 4 % (ref 0–5)
ERYTHROCYTE [DISTWIDTH] IN BLOOD BY AUTOMATED COUNT: 14.5 % (ref 11.6–14.5)
GLUCOSE BLD STRIP.AUTO-MCNC: 99 MG/DL (ref 70–110)
GLUCOSE SERPL-MCNC: 102 MG/DL (ref 74–99)
HCT VFR BLD AUTO: 29 % (ref 35–45)
HGB BLD-MCNC: 9.4 G/DL (ref 12–16)
INR PPP: 1.2 (ref 0.8–1.2)
LYMPHOCYTES # BLD: 0.7 K/UL (ref 0.9–3.6)
LYMPHOCYTES NFR BLD: 19 % (ref 21–52)
MCH RBC QN AUTO: 29.8 PG (ref 24–34)
MCHC RBC AUTO-ENTMCNC: 32.4 G/DL (ref 31–37)
MCV RBC AUTO: 92.1 FL (ref 74–97)
MONOCYTES # BLD: 0.3 K/UL (ref 0.05–1.2)
MONOCYTES NFR BLD: 10 % (ref 3–10)
NEUTS SEG # BLD: 2.3 K/UL (ref 1.8–8)
NEUTS SEG NFR BLD: 66 % (ref 40–73)
PLATELET # BLD AUTO: 124 K/UL (ref 135–420)
PMV BLD AUTO: 10.7 FL (ref 9.2–11.8)
POTASSIUM SERPL-SCNC: 4.8 MMOL/L (ref 3.5–5.5)
PROTHROMBIN TIME: 14.6 SEC (ref 11.5–15.2)
RBC # BLD AUTO: 3.15 M/UL (ref 4.2–5.3)
SODIUM SERPL-SCNC: 143 MMOL/L (ref 136–145)
SPECIMEN EXP DATE BLD: NORMAL
TROPONIN I SERPL-MCNC: 0.02 NG/ML (ref 0–0.04)
WBC # BLD AUTO: 3.5 K/UL (ref 4.6–13.2)

## 2018-08-29 PROCEDURE — 70450 CT HEAD/BRAIN W/O DYE: CPT

## 2018-08-29 PROCEDURE — 80048 BASIC METABOLIC PNL TOTAL CA: CPT | Performed by: EMERGENCY MEDICINE

## 2018-08-29 PROCEDURE — 85025 COMPLETE CBC W/AUTO DIFF WBC: CPT | Performed by: EMERGENCY MEDICINE

## 2018-08-29 PROCEDURE — 85730 THROMBOPLASTIN TIME PARTIAL: CPT | Performed by: EMERGENCY MEDICINE

## 2018-08-29 PROCEDURE — 84484 ASSAY OF TROPONIN QUANT: CPT | Performed by: EMERGENCY MEDICINE

## 2018-08-29 PROCEDURE — 83880 ASSAY OF NATRIURETIC PEPTIDE: CPT | Performed by: EMERGENCY MEDICINE

## 2018-08-29 PROCEDURE — 86900 BLOOD TYPING SEROLOGIC ABO: CPT | Performed by: EMERGENCY MEDICINE

## 2018-08-29 PROCEDURE — 71045 X-RAY EXAM CHEST 1 VIEW: CPT

## 2018-08-29 PROCEDURE — 99285 EMERGENCY DEPT VISIT HI MDM: CPT

## 2018-08-29 PROCEDURE — 93005 ELECTROCARDIOGRAM TRACING: CPT

## 2018-08-29 PROCEDURE — 82962 GLUCOSE BLOOD TEST: CPT

## 2018-08-29 PROCEDURE — 85610 PROTHROMBIN TIME: CPT | Performed by: EMERGENCY MEDICINE

## 2018-08-29 NOTE — ED NOTES
I have reviewed discharge instructions with the patient. The patient verbalized understanding. Patient armband removed and shredded. VSS. Patient provided with a bus pass to get home

## 2018-08-29 NOTE — ED TRIAGE NOTES
Pt presents via EMS w/ c/o rectal bleeding x 1 week. Hx anal cancer, cervical cancer, heart transplant.  Takes Eliquis, ASA 81

## 2018-08-29 NOTE — ED PROVIDER NOTES
EMERGENCY DEPARTMENT HISTORY AND PHYSICAL EXAM 
 
3:31 PM 
 
 
Date: 8/29/2018 Patient Name: Solo Auguste History of Presenting Illness Chief Complaint Patient presents with  Rectal Bleeding Patient with a history of heart transplant in 1999, tricuspid valve replacement 2013 and recent cardioversion, recently diagnosed anal cancer currently on radiation therapy awaiting for a excision not currently on chemotherapy presents with rectal bleeding. Reports a large amount, heavily staining at sitz bath and tripping on the floor of this is been bleeding for the past one week she has been called by her radiation oncologist and refer back to the emergency department. She has shortness of breath no lightheadedness no chest pain she believes that this because she did not take her Lasix. She did not take her Eliquis today but she did take it last night. She is awaiting a nuclear medicine stress test for cardiac clearance as well as a consultation with the nephrologist.  She denies any abdominal pain or any other symptoms. PCP: Parag Mauricio MD 
 
Current Outpatient Prescriptions Medication Sig Dispense Refill  oxyCODONE-acetaminophen (PERCOCET) 5-325 mg per tablet Take 1 Tab by mouth every four (4) hours as needed for Pain. Max Daily Amount: 6 Tabs. 20 Tab 0  
 furosemide (LASIX) 40 mg tablet Take 40 mg by mouth two (2) times a day.  CYCLOSPORINE MODIFIED PO Take 50 mg by mouth two (2) times a day.  acetaminophen (TYLENOL) 325 mg tablet 325 mg.    
 apixaban (ELIQUIS) 5 mg tablet Take 5 mg by mouth two (2) times a day.  cetirizine (ZYRTEC) 10 mg tablet Take 10 mg by mouth daily.  pravastatin (PRAVACHOL) 20 mg tablet Take 40 mg by mouth nightly.  lisinopril (PRINIVIL, ZESTRIL) 5 mg tablet Take 5 mg by mouth two (2) times a day.  hydrOXYzine HCl (ATARAX) 25 mg tablet Take 25 mg by mouth every six (6) hours as needed.  pregabalin (LYRICA) 200 mg capsule Take 200 mg by mouth three (3) times daily.  LORazepam (ATIVAN) 1 mg tablet Take 1 mg by mouth three (3) times daily.  OXcarbazepine (TRILEPTAL) 300 mg tablet Take 450 mg by mouth nightly.  aspirin delayed-release 81 mg tablet Take 81 mg by mouth daily.  mycophenolate (CELLCEPT) 500 mg tablet Take 250 mg by mouth two (2) times a day.  polyethylene glycol (MIRALAX) 17 gram packet Take 8.5 g by mouth daily as needed.  OXcarbazepine (TRILEPTAL) 150 mg tablet Take  by mouth every morning.  buPROPion SR (WELLBUTRIN SR) 200 mg SR tablet Take 200 mg by mouth two (2) times a day. Indications: DEPRESSION Past History Past Medical History: 
Past Medical History:  
Diagnosis Date  Arthritis  Avascular necrosis of femoral head, left ([facility] [address]  CAD (coronary artery disease)  Cancer ([facility] [address] Vaginal  
 GERD (gastroesophageal reflux disease)  Hypercholesteremia  Hypertension  Ill-defined condition   
 fractured foot, Bilateral  
 Ill-defined condition   
 wrist fracture  Psychiatric disorder Depression, Anxiety, Bipolar  S/P radiation therapy  Status post chemotherapy Past Surgical History: 
Past Surgical History:  
Procedure Laterality Date  COLONOSCOPY N/A 5/23/2018 COLONOSCOPY performed by Shon Durbin MD at [facility] ENDOSCOPY  
 [facility] CYST REMOVAL  2006  HX GYN Laser [address] VALVE SURGERY  2013 Tricuspid valve [address] [place] [address] Family History: 
Family History Problem Relation Age of Onset  Heart Disease Father  Heart Disease Brother Social History: 
Social History Substance Use Topics  Smoking status: Former Smoker Quit date: 1/23/2015  Smokeless tobacco: Never Used  Alcohol use No  
 
 
Allergies: Allergies Allergen Reactions  Latex, Natural Rubber Rash  Dobutamine Anaphylaxis  Imuran [Azathioprine] Other (comments) Bad liver trouble  Other Medication Hives  
  electrodes  Copper Rash  Nsaids (Non-Steroidal Anti-Inflammatory Drug) Other (comments) Contrary to immunosuppressants  Avelox [Moxifloxacin] Other (comments)  
  unknown  Milrinone Other (comments) Migraine headache  Other Plant, Animal, Environmental Rash Allergic to band-aids, detergent, deodorant, bath soap  Tape [Adhesive] Hives, Rash, Contact Dermatitis and Other (comments) Review of Systems Review of Systems Visit Vitals  /57  Pulse (!) 41  Temp 98.8 °F (37.1 °C)  Resp 13  Ht 5' 6\" (1.676 m)  Wt 85.3 kg (188 lb)  SpO2 97%  BMI 30.34 kg/m2 Physical Exam / Medical Decision Making I am the first provider for this patient. I reviewed the vital signs, available nursing notes, past medical history, past surgical history, family history and social history. Vital Signs-Reviewed the patient's vital signs. Physical exam: 
General: Obese nontoxic nondiaphoretic, no apparent distress. Head:  Normocephalic atraumatic. Eyes:  Pupils midrange extraocular movements intact. No pallor or conjunctival injection. Nose:  No rhinorrhea, inspection grossly normal.   
Ears:  Grossly normal to inspection, no discharge. Mouth:  Mucous membranes moist, no appreciable intraoral lesion. Neck/Back:  Trachea midline, no asymmetry. Chest:  Grossly normal inspection, symmetric chest rise. Pulmonary:  Clear to auscultation bilaterally no wheezes rhonchi or rales. Cardiovascular:  S1-S2 no murmurs rubs or gallops. Abdomen: Soft, nontender, nondistended no guarding rebound or peritoneal signs. Extremities:  Grossly normal to inspection, peripheral pulses intact Neurologic:  Alert and oriented no appreciable focal neurologic deficit Skin:  Warm and dry Psychiatric:  Grossly normal mood and affect Nursing note reviewed, vital signs reviewed. ED course: 
Patient with rectal bleeding history of recently diagnosed anal cancer pending a removal currently on radiation therapy. Complaints are of dyspnea and rectal bleeding, will rule out atypical ACS, evaluate for degree of anemia and monitor we'll contact colorectal surgery Monitor sinus bradycardia Has long history of sinus bradycardia after her ablation blood pressure is normal 
 
EKG done at 1312 hrs. sinus sinus bradycardia with first-degree AV block MA interval is approximately 240 wide QRS duration 16D RIGHT bundle branch block morphology there are expected ST changes and T-wave inversions morphology is unchanged compared to May 21, 2018 Hemoglobin 10.2 on 8/23/18 and today it is 9.4 Rectal exam chaperoned by nurse Ramy Carrillo.  Disc shaped mass approximately 4 cm diameter with bright red blood, questionable necrotic area anterior Consult:  Discussed care with Dr. Harry Harrington. Standard discussion; including history of patients chief complaint, available diagnostic results, and treatment course. Patient should stop her  oral anticoagulants and aspirin follow-up with her radiation oncologist, plan is for surgery on the 13th Patient reevaluated at 1500 hrs.: She understands the plan for outpatient therapy Reevaluated the patient at 1530 hrs. by nurse request patient had a sudden onset of LEFT arm weakness and numbness Repeat evaluation done at 1530 hrs. Able to answer name and date correctly Performs eye closing and hand squeeze tasks No dysarthria or aphasia Symmetric smile, uvula midline, no blunting of nasolabial fold Facial sensation grossly intact to light touch UPPER extremity: 
Bilateral arm raised off bed and held against gravity for 5 seconds No past pointing Sensation grossly intact to light touch on RIGHT but decreased on LEFT 
LOWER extremity: 
Bilateral leg raises off bed and held against gravity for 5 seconds Sensation grossly intact to light touch Consult:  Discussed care with Dr. Rory Kwong. Standard discussion; including history of patients chief complaint, available diagnostic results, and treatment course. CT head negative Patient seen by tele-neurology, no further intervention necessary at this time, stable for discharge home per his report Patient comfortable with this plan, she will be discharged to follow up with cardiology and colorectal surgery as well as radiation oncology Patient's presentation, history, physical exam and laboratory evaluations were reviewed. At this time patient was felt to be stable for outpatient management and follow with primary care/specialist.  Patient was instructed to return to the emergency department with any concerns. Disposition: 
 
Discharged home Portions of this chart were created with Dragon medical speech to text program.   Unrecognized errors may be present. Diagnostic Study Results Labs - Recent Results (from the past 12 hour(s)) CBC WITH AUTOMATED DIFF Collection Time: 08/29/18  1:03 PM  
Result Value Ref Range WBC 3.5 (L) 4.6 - 13.2 K/uL  
 RBC 3.15 (L) 4.20 - 5.30 M/uL HGB 9.4 (L) 12.0 - 16.0 g/dL HCT 29.0 (L) 35.0 - 45.0 % MCV 92.1 74.0 - 97.0 FL  
 MCH 29.8 24.0 - 34.0 PG  
 MCHC 32.4 31.0 - 37.0 g/dL  
 RDW 14.5 11.6 - 14.5 % PLATELET 095 (L) 377 - 420 K/uL MPV 10.7 9.2 - 11.8 FL  
 NEUTROPHILS 66 40 - 73 % LYMPHOCYTES 19 (L) 21 - 52 % MONOCYTES 10 3 - 10 % EOSINOPHILS 4 0 - 5 % BASOPHILS 1 0 - 2 %  
 ABS. NEUTROPHILS 2.3 1.8 - 8.0 K/UL  
 ABS. LYMPHOCYTES 0.7 (L) 0.9 - 3.6 K/UL  
 ABS. MONOCYTES 0.3 0.05 - 1.2 K/UL  
 ABS. EOSINOPHILS 0.1 0.0 - 0.4 K/UL  
 ABS. BASOPHILS 0.0 0.0 - 0.1 K/UL  
 DF AUTOMATED METABOLIC PANEL, BASIC Collection Time: 08/29/18  1:03 PM  
Result Value Ref Range Sodium 143 136 - 145 mmol/L  Potassium 4.8 3.5 - 5.5 mmol/L  
 Chloride 115 (H) 100 - 108 mmol/L  
 CO2 21 21 - 32 mmol/L Anion gap 7 3.0 - 18 mmol/L Glucose 102 (H) 74 - 99 mg/dL BUN 48 (H) 7.0 - 18 MG/DL Creatinine 1.93 (H) 0.6 - 1.3 MG/DL  
 BUN/Creatinine ratio 25 (H) 12 - 20 GFR est AA 32 (L) >60 ml/min/1.73m2 GFR est non-AA 27 (L) >60 ml/min/1.73m2 Calcium 8.6 8.5 - 10.1 MG/DL  
TYPE & SCREEN Collection Time: 08/29/18  1:03 PM  
Result Value Ref Range Crossmatch Expiration 09/01/2018 ABO/Rh(D) O POSITIVE Antibody screen NEG   
TROPONIN I Collection Time: 08/29/18  1:03 PM  
Result Value Ref Range Troponin-I, Qt. 0.02 0.0 - 0.045 NG/ML  
NT-PRO BNP Collection Time: 08/29/18  1:03 PM  
Result Value Ref Range NT pro-BNP 2579 (H) 0 - 900 PG/ML Radiologic Studies -  
XR CHEST PORT Final Result IMPRESSION:  
  
  
1. Cardiomegaly without CHF. CT HEAD WO CONT    (Results Pending) Diagnosis Clinical Impression:  
1. Squamous cell carcinoma of anus (HCC) 2. Blood loss anemia Follow-up Information Follow up With Details Comments Contact Info  
 your own radiation oncologist Call in 1 day [facility] EMERGENCY DEPT  As needed, If symptoms worsen [address] 
[phone] Patient's Medications Start Taking No medications on file Continue Taking ACETAMINOPHEN (TYLENOL) 325 MG TABLET    325 mg. APIXABAN (ELIQUIS) 5 MG TABLET    Take 5 mg by mouth two (2) times a day. ASPIRIN DELAYED-RELEASE 81 MG TABLET    Take 81 mg by mouth daily. BUPROPION SR (WELLBUTRIN SR) 200 MG SR TABLET    Take 200 mg by mouth two (2) times a day. Indications: DEPRESSION  
 CETIRIZINE (ZYRTEC) 10 MG TABLET    Take 10 mg by mouth daily. CYCLOSPORINE MODIFIED PO    Take 50 mg by mouth two (2) times a day. FUROSEMIDE (LASIX) 40 MG TABLET    Take 40 mg by mouth two (2) times a day.   
 HYDROXYZINE HCL (ATARAX) 25 MG TABLET    Take 25 mg by mouth every six (6) hours as needed. LISINOPRIL (PRINIVIL, ZESTRIL) 5 MG TABLET    Take 5 mg by mouth two (2) times a day. LORAZEPAM (ATIVAN) 1 MG TABLET    Take 1 mg by mouth three (3) times daily. MYCOPHENOLATE (CELLCEPT) 500 MG TABLET    Take 250 mg by mouth two (2) times a day. OXCARBAZEPINE (TRILEPTAL) 150 MG TABLET    Take  by mouth every morning. OXCARBAZEPINE (TRILEPTAL) 300 MG TABLET    Take 450 mg by mouth nightly. OXYCODONE-ACETAMINOPHEN (PERCOCET) 5-325 MG PER TABLET    Take 1 Tab by mouth every four (4) hours as needed for Pain. Max Daily Amount: 6 Tabs. POLYETHYLENE GLYCOL (MIRALAX) 17 GRAM PACKET    Take 8.5 g by mouth daily as needed. PRAVASTATIN (PRAVACHOL) 20 MG TABLET    Take 40 mg by mouth nightly. PREGABALIN (LYRICA) 200 MG CAPSULE    Take 200 mg by mouth three (3) times daily. These Medications have changed No medications on file Stop Taking No medications on file  
 
_______________________________ Attestations: 
Scribe Attestation Sherryle Bachelor, MD acting as a scribe for and in the presence of Sherryle Bachelor, MD     
August 29, 2018 at 3:31 PM 
    
Provider Attestation:     
I personally performed the services described in the documentation, reviewed the documentation, as recorded by the scribe in my presence, and it accurately and completely records my words and actions. August 29, 2018 at 3:31 PM - Sherryle Bachelor, MD   
_______________________________

## 2018-08-29 NOTE — ED NOTES
On way back to ER from CT scan patient states \"my arm isnt very tingly anymore since im not lying on it anymore\"

## 2018-08-29 NOTE — ED NOTES
Attempted to discharge patient and patient states she has severe left arm tingling. Dr. Rc Zelaya called to bedside and Code S level 2 called at this time

## 2018-08-29 NOTE — DISCHARGE INSTRUCTIONS
Anemia From Heavy Bleeding: Care Instructions  Your Care Instructions    Anemia means that your body does not have enough red blood cells. Red blood cells carry oxygen around the body. When you have anemia, you may feel dizzy, tired, and weak. You may also feel your heart pounding. For some people, it's hard to focus and think clearly. One common cause of anemia is bleeding. Bleeding from ulcers, hemorrhoids, cancer, or other problems can cause anemia. It may also be caused by heavy menstrual periods. Your treatment may include iron pills. Iron helps your body make hemoglobin. Hemoglobin is the part of the red blood cell that carries oxygen. If you have severe anemia, you may need a blood transfusion to give you red blood cells as quickly as possible. Sometimes it takes several months to get iron levels back to normal.  Follow-up care is a key part of your treatment and safety. Be sure to make and go to all appointments, and call your doctor if you are having problems. It's also a good idea to know your test results and keep a list of the medicines you take. How can you care for yourself at home? · Be safe with medicines. Take your medicines exactly as prescribed. Call your doctor if you think you are having a problem with your medicine. · Follow your doctor's advice about eating foods that have a lot of iron in them. These include red meat, shellfish, poultry, and eggs. They also include beans, raisins, whole-grain bread, and leafy green vegetables. · Steam your vegetables. This is the best way to prepare them if you want to get as much iron as possible. · Iron pills can cause constipation. If you take them, there are things you can do to avoid constipation. Drink plenty of fluids, eat foods with a lot of fiber, and exercise every day. When should you call for help? Call 911 anytime you think you may need emergency care.  For example, call if:    · You passed out (lost consciousness).     · Your stools are maroon or very bloody.    Call your doctor now or seek immediate medical care if:    · You are short of breath.     · You have new or worse bleeding.     · You are dizzy or light-headed, or you feel like you may faint.    Watch closely for changes in your health, and be sure to contact your doctor if:    · You feel weaker or more tired than usual.     · You do not get better as expected. Where can you learn more? Go to http://ly-silke.info/. Enter Z410 in the search box to learn more about \"Anemia From Heavy Bleeding: Care Instructions. \"  Current as of: October 9, 2017  Content Version: 11.7  © 6671-5908 [employer]. Care instructions adapted under license by [employer] (which disclaims liability or warranty for this information). If you have questions about a medical condition or this instruction, always ask your healthcare professional. [address] any warranty or liability for your use of this information.

## 2018-08-30 LAB
ATRIAL RATE: 45 BPM
CALCULATED R AXIS, ECG10: 39 DEGREES
CALCULATED T AXIS, ECG11: -79 DEGREES
DIAGNOSIS, 93000: NORMAL
Q-T INTERVAL, ECG07: 506 MS
QRS DURATION, ECG06: 160 MS
QTC CALCULATION (BEZET), ECG08: 437 MS
VENTRICULAR RATE, ECG03: 45 BPM

## 2018-09-07 ENCOUNTER — TELEPHONE (OUTPATIENT)
Dept: OTHER | Age: 58
End: 2018-09-07

## 2018-09-07 NOTE — TELEPHONE ENCOUNTER
Called pt today to provide follow-up. Pt states she continues to have a small amount of rectal bleeding associated with mucus and urine incontinence upon coughing. Pt is requesting to have golytely bowel prep (lemon instead of pineapple) called into her preferred listed pharmacy. No other concerns at this time.

## 2018-09-10 ENCOUNTER — TELEPHONE (OUTPATIENT)
Dept: SURGERY | Age: 58
End: 2018-09-10

## 2018-09-10 DIAGNOSIS — K62.5 RECTAL BLEEDING: Primary | ICD-10-CM

## 2018-09-10 DIAGNOSIS — C21.0 SQUAMOUS CELL CARCINOMA OF ANUS (HCC): ICD-10-CM

## 2018-09-10 RX ORDER — POLYETHYLENE GLYCOL 3350, SODIUM SULFATE ANHYDROUS, SODIUM BICARBONATE, SODIUM CHLORIDE, POTASSIUM CHLORIDE 236; 22.74; 6.74; 5.86; 2.97 G/4L; G/4L; G/4L; G/4L; G/4L
POWDER, FOR SOLUTION ORAL
Qty: 1 BOTTLE | Refills: 0 | Status: SHIPPED | OUTPATIENT
Start: 2018-09-10

## 2018-09-20 ENCOUNTER — TELEPHONE (OUTPATIENT)
Dept: SURGERY | Age: 58
End: 2018-09-20

## 2018-09-20 DIAGNOSIS — K62.89 ANAL PAIN: Primary | ICD-10-CM

## 2018-09-20 RX ORDER — OXYCODONE AND ACETAMINOPHEN 5; 325 MG/1; MG/1
1 TABLET ORAL
Qty: 12 TAB | Refills: 0 | Status: SHIPPED | OUTPATIENT
Start: 2018-09-20

## 2018-09-20 NOTE — TELEPHONE ENCOUNTER
Patient daughter Aura Click called regarding prescription for pain medication. Patient surgery today was canceled due to cardiac status. Patient report recurring anal pain due to large mass and request refill for pain control. Patient report Dr. Rupert Torres who normally prescribes pain medication  is out of office today and unable to prescribe percocet, he will return on Monday. Prescription for Percocet 5-325mg Po will be prescribed until Monday when Dr. Rupert Torres returns. Daughter Aura Click aware and will be available to  prescription from the office.

## 2018-11-01 ENCOUNTER — TELEPHONE (OUTPATIENT)
Dept: SURGERY | Age: 58
End: 2018-11-01

## 2018-11-01 NOTE — TELEPHONE ENCOUNTER
Dr. Vicky Andrade office called in regards to why the surgery was canceled. Spoke with Dr. Eve Delgadillo who advised that [facility] did not want to do the surgery being that there was no transplant department on campus and patient was transfered to [facility] or [address] [place] [address] was given, no further questions or concerns noted.

## 2018-11-02 ENCOUNTER — NURSE NAVIGATOR (OUTPATIENT)
Dept: OTHER | Age: 58
End: 2018-11-02

## 2018-11-02 NOTE — NURSE NAVIGATOR
Spoke with pt today via telephone, pt stated \"I feel good today\". She will have a CT Scan with contrast on 11/7/18, then undergo colorectal surgery (APR) with Dr. Flakita Collado on 11/12/18. 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 Information communicated to Dr. Ania Daniels and Dr. Flakita Collado. She continues to have rectal bleeding associated with rectal pain. Her pain is controlled on pain current meds. Pt is on a high fiber diet and Miralax regiment to prevent constipation. No other complaint voiced today. Will continue to follow pt.

## (undated) DEVICE — SOLUTION IV 1000ML 0.9% SOD CHL

## (undated) DEVICE — STERILE POLYISOPRENE POWDER-FREE SURGICAL GLOVES: Brand: PROTEXIS

## (undated) DEVICE — FLEX ADVANTAGE 1500CC: Brand: FLEX ADVANTAGE

## (undated) DEVICE — NEEDLE HYPO 25GA L1.5IN BLU POLYPR HUB S STL REG BVL STR

## (undated) DEVICE — SHEAR RMFG HARMONIC FOCUS 9CM -- OEM ITEM L#322125

## (undated) DEVICE — KIT COLON W/ 1.1OZ LUB AND 2 END

## (undated) DEVICE — HARMONIC HAND PIECE BLUE --

## (undated) DEVICE — FLEXIBLE YANKAUER,MEDIUM TIP, NO VACUUM CONTROL: Brand: ARGYLE

## (undated) DEVICE — REM POLYHESIVE ADULT PATIENT RETURN ELECTRODE: Brand: VALLEYLAB

## (undated) DEVICE — Device

## (undated) DEVICE — MEDI-VAC NON-CONDUCTIVE SUCTION TUBING: Brand: CARDINAL HEALTH

## (undated) DEVICE — SYR 50ML SLIP TIP NSAF LF STRL --

## (undated) DEVICE — SPONGE GZ W4XL4IN COT 12 PLY TYP VII WVN C FLD DSGN

## (undated) DEVICE — SYR IRR CATH TIP LR ADPT 70ML -- CONVERT TO ITEM 363120

## (undated) DEVICE — DEVICE INFL 60ML 12ATM CONVENIENT LOK REL HNDL HI PRSS FLX

## (undated) DEVICE — BASIN EMESIS 500CC ROSE 250/CS 60/PLT: Brand: MEDEGEN MEDICAL PRODUCTS, LLC

## (undated) DEVICE — SUT CHRMC 3-0 27IN SH BRN --

## (undated) DEVICE — KENDALL SCD EXPRESS SLEEVES, KNEE LENGTH, MEDIUM: Brand: KENDALL SCD

## (undated) DEVICE — SLEEVE COMPR STD 12 IN FOR 165IN CALF COMFORT VENODYNE SYS

## (undated) DEVICE — NEEDLE HYPO 25GA L1.5IN BVL ORIENTED ECLIPSE

## (undated) DEVICE — TRAY PREP DRY W/ PREM GLV 2 APPL 6 SPNG 2 UNDPD 1 OVERWRAP

## (undated) DEVICE — SYR 10ML CTRL LR LCK NSAF LF --

## (undated) DEVICE — KENDALL 500 SERIES DIAPHORETIC FOAM MONITORING ELECTRODE - TEAR DROP SHAPE ( 30/PK): Brand: KENDALL

## (undated) DEVICE — HEX-LOCKING BLADE ELECTRODE: Brand: EDGE

## (undated) DEVICE — PAD,ABDOMINAL,5"X9",STERILE,LF,1/PK: Brand: MEDLINE INDUSTRIES, INC.